# Patient Record
Sex: FEMALE | Race: BLACK OR AFRICAN AMERICAN | NOT HISPANIC OR LATINO | Employment: FULL TIME | URBAN - METROPOLITAN AREA
[De-identification: names, ages, dates, MRNs, and addresses within clinical notes are randomized per-mention and may not be internally consistent; named-entity substitution may affect disease eponyms.]

---

## 2023-04-05 ENCOUNTER — APPOINTMENT (OUTPATIENT)
Dept: PRIMARY CARE | Facility: CLINIC | Age: 28
End: 2023-04-05
Payer: COMMERCIAL

## 2023-05-30 PROBLEM — E04.1 THYROID NODULE: Status: ACTIVE | Noted: 2022-05-19

## 2023-05-30 PROBLEM — E04.2 MULTIPLE THYROID NODULES: Status: ACTIVE | Noted: 2023-05-30

## 2023-07-12 DIAGNOSIS — I10 BENIGN ESSENTIAL HTN: ICD-10-CM

## 2023-07-12 DIAGNOSIS — F33.9 RECURRENT MAJOR DEPRESSIVE DISORDER, REMISSION STATUS UNSPECIFIED (CMS-HCC): Primary | ICD-10-CM

## 2023-07-12 RX ORDER — BUPROPION HYDROCHLORIDE 300 MG/1
300 TABLET ORAL EVERY MORNING
Qty: 14 TABLET | Refills: 0 | Status: SHIPPED | OUTPATIENT
Start: 2023-07-12 | End: 2023-07-21 | Stop reason: SDUPTHER

## 2023-07-12 RX ORDER — HYDROCHLOROTHIAZIDE 12.5 MG/1
12.5 TABLET ORAL DAILY
Qty: 14 TABLET | Refills: 0 | Status: SHIPPED | OUTPATIENT
Start: 2023-07-12 | End: 2023-07-21 | Stop reason: SDUPTHER

## 2023-07-15 LAB
ANION GAP IN SER/PLAS: 12 MMOL/L (ref 10–20)
CALCIUM (MG/DL) IN SER/PLAS: 8.5 MG/DL (ref 8.6–10.3)
CARBON DIOXIDE, TOTAL (MMOL/L) IN SER/PLAS: 23 MMOL/L (ref 21–32)
CHLORIDE (MMOL/L) IN SER/PLAS: 107 MMOL/L (ref 98–107)
CHOLESTEROL (MG/DL) IN SER/PLAS: 214 MG/DL (ref 0–199)
CHOLESTEROL IN HDL (MG/DL) IN SER/PLAS: 32.8 MG/DL
CHOLESTEROL/HDL RATIO: 6.5
CREATININE (MG/DL) IN SER/PLAS: 0.85 MG/DL (ref 0.5–1.05)
ESTIMATED AVERAGE GLUCOSE FOR HBA1C: 114 MG/DL
GFR FEMALE: >90 ML/MIN/1.73M2
GLUCOSE (MG/DL) IN SER/PLAS: 83 MG/DL (ref 74–99)
HEMOGLOBIN A1C/HEMOGLOBIN TOTAL IN BLOOD: 5.6 %
LDL: 154 MG/DL (ref 0–99)
POTASSIUM (MMOL/L) IN SER/PLAS: 3.9 MMOL/L (ref 3.5–5.3)
SODIUM (MMOL/L) IN SER/PLAS: 138 MMOL/L (ref 136–145)
TRIGLYCERIDE (MG/DL) IN SER/PLAS: 134 MG/DL (ref 0–149)
UREA NITROGEN (MG/DL) IN SER/PLAS: 10 MG/DL (ref 6–23)
VLDL: 27 MG/DL (ref 0–40)

## 2023-07-21 ENCOUNTER — OFFICE VISIT (OUTPATIENT)
Dept: PRIMARY CARE | Facility: CLINIC | Age: 28
End: 2023-07-21
Payer: COMMERCIAL

## 2023-07-21 VITALS
DIASTOLIC BLOOD PRESSURE: 82 MMHG | WEIGHT: 285 LBS | SYSTOLIC BLOOD PRESSURE: 126 MMHG | TEMPERATURE: 97.6 F | OXYGEN SATURATION: 98 % | BODY MASS INDEX: 45.8 KG/M2 | HEART RATE: 100 BPM | HEIGHT: 66 IN

## 2023-07-21 DIAGNOSIS — F33.9 RECURRENT MAJOR DEPRESSIVE DISORDER, REMISSION STATUS UNSPECIFIED (CMS-HCC): ICD-10-CM

## 2023-07-21 DIAGNOSIS — E66.01 CLASS 3 OBESITY (MULTI): ICD-10-CM

## 2023-07-21 DIAGNOSIS — F33.40 RECURRENT MAJOR DEPRESSIVE DISORDER, IN REMISSION (CMS-HCC): ICD-10-CM

## 2023-07-21 DIAGNOSIS — I10 BENIGN ESSENTIAL HTN: ICD-10-CM

## 2023-07-21 DIAGNOSIS — F31.9 BIPOLAR 1 DISORDER (MULTI): ICD-10-CM

## 2023-07-21 DIAGNOSIS — K21.9 GASTROESOPHAGEAL REFLUX DISEASE, UNSPECIFIED WHETHER ESOPHAGITIS PRESENT: ICD-10-CM

## 2023-07-21 DIAGNOSIS — F41.1 GAD (GENERALIZED ANXIETY DISORDER): ICD-10-CM

## 2023-07-21 DIAGNOSIS — G47.33 MILD OBSTRUCTIVE SLEEP APNEA: Primary | ICD-10-CM

## 2023-07-21 DIAGNOSIS — E78.2 MIXED HYPERLIPIDEMIA: ICD-10-CM

## 2023-07-21 PROBLEM — E66.813 CLASS 3 OBESITY: Status: ACTIVE | Noted: 2023-07-21

## 2023-07-21 PROCEDURE — 99214 OFFICE O/P EST MOD 30 MIN: CPT | Performed by: FAMILY MEDICINE

## 2023-07-21 PROCEDURE — 3079F DIAST BP 80-89 MM HG: CPT | Performed by: FAMILY MEDICINE

## 2023-07-21 PROCEDURE — 3008F BODY MASS INDEX DOCD: CPT | Performed by: FAMILY MEDICINE

## 2023-07-21 PROCEDURE — 1036F TOBACCO NON-USER: CPT | Performed by: FAMILY MEDICINE

## 2023-07-21 PROCEDURE — 3074F SYST BP LT 130 MM HG: CPT | Performed by: FAMILY MEDICINE

## 2023-07-21 RX ORDER — PANTOPRAZOLE SODIUM 20 MG/1
20 TABLET, DELAYED RELEASE ORAL DAILY
Qty: 90 TABLET | Refills: 1 | Status: SHIPPED | OUTPATIENT
Start: 2023-07-21 | End: 2024-04-19 | Stop reason: SDUPTHER

## 2023-07-21 RX ORDER — DOXYCYCLINE 50 MG/1
50 TABLET ORAL 2 TIMES DAILY
COMMUNITY
End: 2023-10-17 | Stop reason: WASHOUT

## 2023-07-21 RX ORDER — MULTIVITAMIN
1 TABLET ORAL DAILY
COMMUNITY

## 2023-07-21 RX ORDER — BUPROPION HYDROCHLORIDE 300 MG/1
300 TABLET ORAL EVERY MORNING
Qty: 90 TABLET | Refills: 1 | Status: SHIPPED | OUTPATIENT
Start: 2023-07-21 | End: 2024-04-19 | Stop reason: SDUPTHER

## 2023-07-21 RX ORDER — HYDROCHLOROTHIAZIDE 12.5 MG/1
12.5 TABLET ORAL DAILY
Qty: 90 TABLET | Refills: 1 | Status: SHIPPED | OUTPATIENT
Start: 2023-07-21 | End: 2024-04-19 | Stop reason: SDUPTHER

## 2023-07-21 ASSESSMENT — ENCOUNTER SYMPTOMS
SHORTNESS OF BREATH: 0
ABDOMINAL PAIN: 0

## 2023-07-21 NOTE — PATIENT INSTRUCTIONS
HTN: meds refilled   Class 3 obesity: Refer to nutrition, encourage 30 minutes of exercise daily  Meds refilled

## 2023-07-21 NOTE — PROGRESS NOTES
Assessment     ASSESSMENT/PLAN:      Problem List Items Addressed This Visit          Endocrine/Metabolic    Class 3 obesity (CMS/HCC)    Relevant Orders    Referral to Nutrition Services       Mental Health    Bipolar 1 disorder (CMS/[facility])    [place] (generalized anxiety disorder)    Recurrent major depressive disorder, in remission (CMS/[facility])       Sleep    Mild obstructive sleep apnea - Primary     Other Visit Diagnoses       Mixed hyperlipidemia        Relevant Orders    Lipid panel    Benign essential HTN        Relevant Medications    hydroCHLOROthiazide (HYDRODiuril) 12.5 mg tablet    Other Relevant Orders    Basic metabolic panel    Gastroesophageal reflux disease, unspecified whether esophagitis present        Relevant Medications    pantoprazole (ProtoNix) 20 mg EC tablet    Recurrent major depressive disorder, remission status unspecified (CMS/[facility])        Relevant Medications    buPROPion XL (Wellbutrin XL) 300 mg 24 hr tablet            Patient Instructions:  Patient Instructions   HTN: meds refilled   Class 3 obesity: Refer to nutrition, encourage 30 minutes of exercise daily  Meds refilled      Signed by: Trung Piedra DO       FUTURE DIRECTION:   none    Subjective   SUBJECTIVE:     HPI : Patient is a 28 y.o. female who presents today for the following:     Works for health department     Thyroid nodules  Follows endo, continue to monitor     Anxiety/Bipolar/Depression:    on tele doc, part of insurance, psychiatry Currently taking rexulti instead of abilify, and wellbutrin 300mg,     Preventative   LMP 4/18, follows OBGYN, utd with PAp, next one in 1 year, has nexplanon     HTN  controlled with hctz 12.5mg      Review of Systems   Respiratory:  Negative for shortness of breath.    Cardiovascular:  Negative for chest pain.   Gastrointestinal:  Negative for abdominal pain.       Past Medical History:   Diagnosis Date    Personal history of other diseases of the circulatory system     History  "of hypertension        Past Surgical History:   Procedure Laterality Date    OTHER SURGICAL HISTORY  08/11/2021    Appendectomy        Current Outpatient Medications   Medication Instructions    benzoyl peroxide 5 % external wash Topical    buPROPion XL (WELLBUTRIN XL) 300 mg, oral, Every morning    doxycycline (ADOXA) 50 mg, oral, 2 times daily, Take with a full glass of water and do not lie down for at least 30 minutes after.    etonogestrel-eluting contraceptive (Nexplanon) 68 mg implant implant subcutaneous    hydroCHLOROthiazide (HYDRODIURIL) 12.5 mg, oral, Daily    multivitamin tablet 1 tablet, oral, Daily    pantoprazole (PROTONIX) 20 mg, oral, Daily    Rexulti 1 mg, oral, Daily    vitamin D3-folic acid 2,500 unit- 1 mg tablet oral, Daily        No Known Allergies     Social History     Socioeconomic History    Marital status: Single     Spouse name: Not on file    Number of children: Not on file    Years of education: Not on file    Highest education level: Not on file   Occupational History    Not on file   Tobacco Use    Smoking status: Never    Smokeless tobacco: Never   Substance and Sexual Activity    Alcohol use: Yes     Comment: not often    Drug use: Yes     Types: Marijuana    Sexual activity: Not on file   Other Topics Concern    Not on file   Social History Narrative    Not on file     Social Determinants of Health     Financial Resource Strain: Not on file   Food Insecurity: Not on file   Transportation Needs: Not on file   Physical Activity: Not on file   Stress: Not on file   Social Connections: Not on file   Intimate Partner Violence: Not on file   Housing Stability: Not on file        No family history on file.     Objective     OBJECTIVE:     Vitals:    07/21/23 0742   BP: 126/82   Pulse: 100   Temp: 36.4 °C (97.6 °F)   SpO2: 98%   Weight: 129 kg (285 lb)   Height: 1.676 m (5' 6\")        Physical Exam  HENT:      Head: Normocephalic and atraumatic.      Nose: Nose normal.      Mouth/Throat: "      Mouth: Mucous membranes are moist.   Eyes:      Pupils: Pupils are equal, round, and reactive to light.   Cardiovascular:      Rate and Rhythm: Normal rate and regular rhythm.      Pulses: Normal pulses.      Heart sounds: No murmur heard.  Pulmonary:      Effort: Pulmonary effort is normal.      Breath sounds: Normal breath sounds.   Abdominal:      Tenderness: There is no abdominal tenderness.   Musculoskeletal:         General: Normal range of motion.      Cervical back: Normal range of motion.   Skin:     General: Skin is warm and dry.   Neurological:      Mental Status: She is alert.   Psychiatric:         Mood and Affect: Mood normal.

## 2023-08-01 DIAGNOSIS — I10 BENIGN ESSENTIAL HTN: ICD-10-CM

## 2023-08-01 DIAGNOSIS — F33.9 RECURRENT MAJOR DEPRESSIVE DISORDER, REMISSION STATUS UNSPECIFIED (CMS-HCC): ICD-10-CM

## 2023-08-01 RX ORDER — BUPROPION HYDROCHLORIDE 300 MG/1
TABLET ORAL
Qty: 14 TABLET | OUTPATIENT
Start: 2023-08-01

## 2023-08-01 RX ORDER — HYDROCHLOROTHIAZIDE 12.5 MG/1
TABLET ORAL
Qty: 14 TABLET | OUTPATIENT
Start: 2023-08-01

## 2023-10-09 ENCOUNTER — OFFICE VISIT (OUTPATIENT)
Dept: OTOLARYNGOLOGY | Facility: CLINIC | Age: 28
End: 2023-10-09
Payer: COMMERCIAL

## 2023-10-09 VITALS — BODY MASS INDEX: 51.21 KG/M2 | WEIGHT: 289 LBS | HEIGHT: 63 IN

## 2023-10-09 DIAGNOSIS — K21.9 GERD WITHOUT ESOPHAGITIS: Primary | ICD-10-CM

## 2023-10-09 DIAGNOSIS — R13.12 OROPHARYNGEAL DYSPHAGIA: ICD-10-CM

## 2023-10-09 PROCEDURE — 1036F TOBACCO NON-USER: CPT | Performed by: OTOLARYNGOLOGY

## 2023-10-09 PROCEDURE — 99213 OFFICE O/P EST LOW 20 MIN: CPT | Performed by: OTOLARYNGOLOGY

## 2023-10-09 PROCEDURE — 3008F BODY MASS INDEX DOCD: CPT | Performed by: OTOLARYNGOLOGY

## 2023-10-09 ASSESSMENT — ENCOUNTER SYMPTOMS
CHILLS: 0
APPETITE CHANGE: 0
SORE THROAT: 0
ADENOPATHY: 0
FEVER: 0
FACIAL SWELLING: 0
UNEXPECTED WEIGHT CHANGE: 0
VOICE CHANGE: 0
COUGH: 0
VOMITING: 0
NAUSEA: 0
FATIGUE: 0
TROUBLE SWALLOWING: 1
SHORTNESS OF BREATH: 0
NECK PAIN: 0
STRIDOR: 0

## 2023-10-09 NOTE — PROGRESS NOTES
Chief Complaint   Patient presents with    hoarness and dysphagia     HPI:  Pardeep Francisco a 28 y.o.old female following up with me today regarding her hoarseness, GERD, and dysphagia. Last seen 11/22.  Her hoarseness is much improved.  She reports continued dysphagia but has not been able to get the swallow test which was previously ordered and is here to re-establish care and get a new order.  She still has mild dysphagia and feels like food/liquid needs to come back up sometimes.    ROS:  Review of Systems   Constitutional:  Negative for appetite change, chills, fatigue, fever and unexpected weight change.   HENT:  Positive for trouble swallowing. Negative for dental problem, drooling, ear pain, facial swelling, hearing loss, mouth sores, sore throat, tinnitus and voice change.    Respiratory:  Negative for cough, shortness of breath and stridor.    Gastrointestinal:  Negative for nausea and vomiting.   Musculoskeletal:  Negative for neck pain.   Hematological:  Negative for adenopathy.        PE:  ENT Physical Exam  Constitutional  Appearance: patient appears well-developed, obesity noted, patient is cooperative;  Communication/Voice: communication appropriate for developmental age; Communication comments: voice improved today  Head and Face  Appearance: head appears normal and face appears normal;  Palpation: facial palpation normal;  Salivary: glands normal;  Ear  Hearing: intact;  Auricles: right auricle normal; left auricle normal;  Ear Canals: right ear canal normal; left ear canal normal;  Tympanic Membranes: right tympanic membrane normal; left tympanic membrane normal;  Nose  External Nose: nares patent bilaterally; external nose normal;  Internal Nose: nasal mucosa normal; septum normal; bilateral inferior turbinates normal;  Oral Cavity/Oropharynx  Lips: normal;  Teeth: normal;  Gums: gingiva normal;  Tongue: normal;  Oral mucosa: normal;  Hard palate: normal;  Soft palate: normal;  Tonsils:  normal;  Neck  Neck: neck normal; neck palpation normal;  Respiratory  Inspection: breathing unlabored;  Cardiovascular  Inspection: extremities are warm and well perfused;  Lymphatic  Palpation: lymph nodes normal;  Neurovestibular  Mental Status: alert and oriented;  Psychiatric: mood normal; affect is appropriate;  Cranial Nerves: cranial nerves intact;       ASSESSMENT AND PLAN:  Problem List Items Addressed This Visit       Oropharyngeal dysphagia    Current Assessment & Plan     Recommend MBS w/speech as well as esophogram because I am concerned that her GERD is complicating her dysphagia or she may have some esophageal dysmotility that is resulting in reflux.  Follow up after imaging obtained.         Relevant Orders    FL modified barium swallow study    GERD without esophagitis - Primary    Overview     Recommend esophagram        Yessenia Pond MD    Head & Neck Surgical Oncology [facility]  Department of Otolaryngology - Head and Neck Surgery         By signing my name below, I, Igor Appleibe, attest that this documentation has been prepared under the direction and in the presence of Dr. Yessenia Pond MD.     All medical record entries made by the Scribe were at my direction and personally dictated by me, Dr. Yessenia Pond. I have reviewed the chart and agree that the record accurately reflects my personal performance of the history, physical exam, discussion and plan.

## 2023-10-09 NOTE — ASSESSMENT & PLAN NOTE
Recommend MBS w/speech as well as esophogram because I am concerned that her GERD is complicating her dysphagia or she may have some esophageal dysmotility that is resulting in reflux.  Follow up after imaging obtained.

## 2023-10-11 DIAGNOSIS — R13.12 OROPHARYNGEAL DYSPHAGIA: ICD-10-CM

## 2023-10-16 NOTE — PATIENT INSTRUCTIONS
"Thank you for coming to the Sleep Medicine Clinic today! Your sleep medicine provider today was: Becca Thomas MD Below is a summary of your treatment plan, patient education, other important information, and our contact numbers.      TREATMENT PLAN     - Start auto-CPAP. Order placed and sent to [facility].  - Follow-up 31-90 days after getting new machine.  - Please read the \"Patient Education\" section below for more detailed information. Try implementing tips, reminders, strategies, and supportive management.   - If not yet done, please sign up for [employer] to make a future schedule, send prescription requests, or send messages.    Additional patient handouts given today:   None    Referrals:  We are referring you the the following:  None    Follow-up Appointment:   No follow-ups on file.    PATIENT EDUCATION     Obstructive Sleep Apnea (TOMA) is a sleep disorder where your upper airway muscles relax during sleep and the airway intermittently and repetitively narrows and collapses leading to partially blocked airway (hypopnea) or completely blocked airway (apnea) which, in turn, can disrupt breathing in sleep, lower oxygen levels while you sleep and cause night time wakings. Because both apnea and hypopnea may cause higher carbon dioxide or low oxygen levels, untreated TOMA can lead to heart arrhythmia, elevation of blood pressure, and make it harder for the body to consolidate memory and facilitate metabolism (leading to higher blood sugars at night). Frequent partial arousals occur during sleep resulting in sleep deprivation and daytime sleepiness. TOMA is associated with an increased risk of cardiovascular disease, stroke, hypertension, and insulin resistance. Moreover, untreated TMOA with excessive daytime sleepiness can increase the risk of motor vehicular accidents.    Some conservative strategies for TOMA regardless of TOMA severity are:   Positional therapy - Avoid sleeping on your back.   Healthy diet and regular " exercise to optimize weight is highly encouraged.   Avoid alcohol late in the evening and sedative-hypnotics as these substances can make sleep apnea worse.   Improve breathing through the nose with intranasal steroid spray, saline rinse, or antihistamines    Safety: Avoid driving vehicle and operating heavy equipment while sleepy. Drowsy driving may lead to life-threatening motor vehicle accidents. A person driving while sleepy is 5 times more likely to have an accident. If you feel sleepy, pull over and take a short power nap (sleep for less than 30 minutes). Otherwise, ask somebody to drive you.    Treatment options for sleep apnea include weight management, positional therapy, Positive Airway Therapy (PAP) therapy, oral appliance therapy, hypoglossal nerve stimulator (Inspire) and select airway surgeries.    Starting Positive Airway Pressure (PAP): You were ordered a device to wear when you sleep called PAP (Positive Airway Pressure) to treat your sleep apnea. The order will be submitted to a durable medical equipment (DME) company who will arrange setting you up with the device. They will provide all the necessary equipment and discuss use and maintenance of the device with you as well as mask fitting and process of replacing / renewing PAP supplies or accessories. Once you get the machine, please start using it immediately. You may not be successful right away and that is okay. Daksha be certain that you keep trying nightly and reach out to DME if you are struggling or having issues with machine usage.     *Please follow-up with me in 1-2 months of starting CPAP to see how well it is working for you and to do some troubleshooting if needed. Also, please bring all PAP equipment with you to follow up appointments unless told otherwise.     Important things to keep in mind as you start PAP:  Insurance will monitor your usage during the first 90 days. You should use your PAP - all night, every night, and including  all naps (especially if naps are more than 30 minutes) for your health. The bare minimum is to use your PAP device while sleeping for at least 4 hours per day at least 5-6 days per week.. Otherwise, your PAP device will be reclaimed by your [employer] company at 90 days.  There are many masks to choose from to wear with your PAP machine. If you are not comfortable with the first mask issued to you, call your DME company and ask for another option to try. You typically have a 30-day mask guarantee from the day you received your machine.   Discuss with your provider if you are having issues breathing with the machine or if the temperature or humidity feel uncomfortable.  Expect to have an adjustment period when you start your device. It helps to continuing wearing the machine every day for a period of time until you get more used to it. You can practice with wearing the mask alone if you need, then add in the PAP air pressure a few days later.   Reach out for help if you are struggling! The sleep medicine department can be reached at [phone]  We encourage you to download data monitoring apps to your phone. For [employer] - MyAir edson. For [employer] - DreamMapper. Both apps are available in the [employer] store for free and are a great tool to monitor your progress with your PAP device night to night.    Tips for success with PAP machine usage:  Comfortable and well-fitting mask  Appropriate pressure on the machine  Using humidification  Support from bed partner and clinical team      Maintaining your CPAP/BPAP device:    The humidification chamber (aka water tank or water chamber) needs to be filled with distilled water to prevent buildup of white deposits in the future. If you cannot find distilled water, you can use tap water but expect to have white deposits buildup seen after prolonged use with tap water. If you start seeing white deposits on the water chamber, you can clean it by filling it  with equal parts of distilled white vinegar and water. Let the vinegar-water mixture sit for 2 hours, and then rinse it with running tap water. Clean with soap and water then let it dry.     You should try to keep your machine clean in order to work well. Here are some tips to clean PAP supplies / accessories:    Clean the humidification chamber (aka water tank) as well as your mask and tubing at least once a week with soap and water.   Alternatively, you can fill a sink or basin with warm water and add a little mild detergent, like Ivory dish soap. Gently wipe your supplies with the soapy water to free all the oils and dirt that may have collected. Once that's done, rinse these items with clean water until the soap is gone and let them air dry. You can hang your tubing over the curtain cheryl in your bathroom so that it dries.  The mask insert (part of the mask that has contact with your skin) needs to be cleaned with soap and water daily. Another option is to wipe them down with CPAP wipes or baby wipes.    You should replace your mask and tubing frequently in order to prevent bacteria buildup, machine damage, and mask seal issues. The older the mask and hose, the high likelihood that there is bacteria buildup in it especially if they are not cleaned regularly. Dirty filters damage machines because build-up of dust and contaminants can cause machine to over-heat, and in time, damage the motor of machine. Cushions lose their seal over time as most masks are made of plastic and silicone while headgear is made of neoprene. These materials will break down with age and frequent use. Here is the recommended replacement schedule for PAP supplies / accessories:    Twice a month- disposable filters and cushions for nasal mask or nasal pillows.  Once a month- cushion for full face mask  Every 3 months- mask with headgear and PAP tubing (standard or heated hose)  Every 6 months- reusable filter, water chamber, and chin strap      Other useful information:    Some people do not put water in the tank while other people prefers to put water in the tank to prevent mouth dryness. Try to experiment to determine which is more comfortable for you.   In general, new machines have 2 years warranty on parts while health insurance allows you to have a new machine once every 5 years.     Common issues with PAP machine:    Mask gets dislodged when turning to the side: Consider getting a CPAP pillow or switching to a mask with hose on top.     Dry mouth:  Your machine has built-in humidifier that heats up the air to prevent dry mouth. It can be adjusted to your comfort. You can try that first and increase setting one level one night at a time to check which setting is comfortable and effective in lessening dry mouth. In some patients with heated hose, adjusting tube temperature to make air warmer can improve dry mouth. If dry mouth persists despite adjusting humidity or tube temperature setting, may apply OTC Biotene gel over the gums at bedtime.  If Biotene gel is not effective, consider trying XEROSTOM gel from [employer].  Also, eliminate or reduce dose of medications that can cause dry mouth if possible. Lastly, may try getting a separate room humidifier machine.    Airleaks: Please call DME as they may need to adjust your mask or refit you with a different kind or different size of mask. In addition, you can ask DME for tips on getting a good mask seal and mask fit.     Difficulty tolerating the mask: Contact your DME to try a different kind of mask and/or call office to get a referral to Sleep Psychologist for CPAP desensitization. CPAP desensitization technique is a set of strategies that helps patient cope with claustrophobia and anxiety related to wearing mask. Alternatively, we can do a daytime mini-sleep study called PAP-nap trial wherein you will try on different kinds of mask and the sleep technician will try different pressure settings on  "CPAP and BPAP machines to see which specific pressure is tolerable and comfortable for you.     Water droplets or moisture within the hose and/or mask: This is called rain-out and it is caused by condensation of too much heated humidity on the cooler walls of the hose. If you have rain-out, turn down humidity settings or get a heated hose. If you already have a heated hose, turn up the \"tube temperature\" of the heated hose. Alternatively, if you don't want to get a heated hose or warmer air, may wrap the CPAP hose with stockings to keep it somewhat warm. Also, you need to place the machine on the floor and lower the hose so that water won't travel upward towards your mask.     PAP desensitization techniques: If you have concerns about something being on your face at night, you can start by getting used to it before trying to sleep with it as follows:      Sit in a comfortable chair or bed. Connect the mask and hose to the CPAP/BiPAP machine. Hold the mask on your face (without straps on) and turn on the machine. Practice breathing with the mask on while awake sitting and watching television, reading, or performing a sedentary activity during the day for 5-10 minutes and then take it off.  If tolerated, try again and gradually build up to longer periods of time. If not tolerated, try and try again until it is more comfortable as you become more desensitized. If you are able to use it for at least 20-30 minutes, move unto the next step.     Sit in a comfortable chair or bed. Connect the mask and hose to the CPAP/BiPAP machine. Strap the mask on your head and turn on the machine. Practice breathing with the mask and headgear on while awake sitting and watching television, reading, or performing a sedentary activity. Start with 5-10 minutes and gradually increasing time until you can wear it comfortably for at least 20-30 minutes, then move to the next step.    Take a shorter daytime nap with machine turned on while you " are in a reclined position in bed, sofa, or recliner. Start with 5-10 minute nap and gradually increase up to 30 minutes. It is not important whether you fall asleep or not. The goal is to rest comfortably with PAP machine on.     Reintroduce PAP machine into nighttime sleep. You can begin using it a portion of the night and gradually increase up to entire night.     Proceed from one step to the next only when you are completely comfortable. If you feel any anxiety or discomfort, return to the previous step, then proceed again when comfortable.    Expect to “work” with your CPAP/BIPAP unit. It is important to try to relax when beginning CPAP/BIPAP therapy. Inhalation and exhalation should occur through the nose only. If you are unable to consistently breathe this way, do not panic or lose hope. There are other types of masks which allow you to breathe through your nose and/or your mouth. Also, in some patients, using intranasal steroid spray (e.g. Flonase or Nasocort or Fluticasone) 1 hour before bedtime and/or before putting on CPAP mask can help tolerate breathing through the mask.    Don't give up after a few attempts--some patients adjust quickly, while some patients need 3-4 weeks (or sometimes even longer) to be accustomed to CPAP therapy.  Contact your sleep medicine specialist if you have a significant change in weight since this may affect your pressure.    You can also go to the following EDUCATION WEBSITES for further information:   American Academy of Sleep Medicine http://sleepeducation.org  National Sleep Foundation: https://sleepfoundation.org  American Sleep Apnea Association: https://www.sleepapnea.org (for patients with sleep apnea)  Narcolepsy Network: https://www.narcolepsynetwork.org (for patients with narcolepsy)  [employer]: https://www.wakeupnarcolepsy.org (for patients with narcolepsy)  Hypersomnia Foundation: https://www.hypersomniafoundation.org (for patients with idiopathic  hypersomnia)  RLS foundation: https://www.rls.org (for patients with restless leg syndrome)    IMPORTANT INFORMATION     Call 911 for medical emergencies.  Our offices are generally open from Monday-Friday, 8 am - 5 pm.   There are no supporting services by either the sleep doctors or their staff on weekends and Holidays, or after 5 PM on weekdays.   If you need to get in touch with me, you may either call my office number or you can use gopogo.  If a referral for a test, for CPAP, or for another specialist was made, and you have not heard about scheduling this within a week, please call scheduling at [phone]).  If you are unable to make your appointment for clinic or an overnight study, kindly call the office or sleep testing center at least 48 hours in advance to cancel and reschedule.  If you are on CPAP, please bring your device's card and/or the device to each clinic appointment.   In case of problems with PAP machine or mask interface, please contact your "[employer] (Durable Medical Equipment) company first. "[employer] is the company who provides you the machine and/or PAP supplies.       PRESCRIPTIONS     We require 7 days advanced notice for prescription refills. If we do not receive the request in this time, we cannot guarantee that your medication will be refilled in time.    IMPORTANT PHONE NUMBERS     Sleep Medicine Clinic Fax: [phone]  Appointments (for Pediatric Sleep Clinic): [phone] - option 1  Appointments (for Adult Sleep Clinic): [phone] - option 2  Appointments (For Sleep Studies): [phone] - option 3  Behavioral Sleep Medicine: [phone]  Sleep Surgery: [phone]  Nutrition Service: [phone]  Weight management clinics with endocrinology: [phone]  Bariatric Services: [phone] (includes weight loss medications and weight loss surgery)  [facility]: [phone] (offers holistic approaches to weight management)  ENT  (Otolaryngology): [phone]  Headache Clinic (Neurology): [phone]  Neurology: [phone]  Psychiatry: [phone]  Pulmonary Function Testing (PFT) Center: [phone]  Pulmonary Medicine: [phone]  Medical Service [facility] ([place]): [phone]      OUR SLEEP TESTING LOCATIONS     Our team will contact you to schedule your sleep study, however, you can contact us as follow:  Main Phone Line (scheduling only): [phone]), option 3  Adult and Pediatric Locations  [facility] (6 years and older): [facility] - 4th floor ([address]) After hours line: [phone]  [facility] (Main campus: All ages): [facility], 6th floor. After hours line: [phone]   [place] (5 years and older; younger considered on case-by-case basis): [address]; [address], [address]. Scheduling  After hours line: [phone]   [place] (6 years and older): [address]; [address]; [address]   [place] (6 years and older): [address] [facility], [address]  After hours line: [phone]   [place] (13 years and older) in [place]: [address], 2nd floor  After hours line: [phone]  [facility] (13 year and older): [address]  After hours line: [phone]     Adult Only Locations:   [place] (18 years and older): [address] [facility], 2nd floor   [place] (18 years and older): [address] [facility]; 4th floor  After hours line: [phone]  [facility] (18 years and older) at [place]: [address] [place] [address]  After hours line: [phone]     CONTACTING YOUR SLEEP MEDICINE PROVIDER and SLEEP TEAM     For issues with your machine or mask interface, please call your DME provider first. DME stands for durable medical company. DME is the company who provides you the machine and/or PAP supplies / accessories.   To schedule, cancel, or reschedule SLEEP STUDY APPOINTMENTS, please  "call the Main Phone Line at [phone] - option 3.   To schedule, cancel, or reschedule CLINIC APPOINTMENTS, you can do it in \"MyChart\", call [phone] to speak with my  (Emperatriz Redd), or call the Main Phone Line at [phone] - option 2  For CLINICAL QUESTIONS or MEDICATION REFILLS, please call direct line for Adult Sleep Nurses at [phone].   Lastly, you can also send a message directly to your provider through \"My Chart\", which is the email service through your  Records Account: [email or web address]       Here at [facility], we wish you a restful sleep!    "

## 2023-10-16 NOTE — PROGRESS NOTES
[facility] SLEEP MEDICINE CLINIC  FOLLOW-UP VISIT NOTE    Clinic Location: [facility]  PCP: Trung Piedra DO    HISTORY OF PRESENT ILLNESS     Patient ID: Pardeep Estrada is a 28 y.o. female who presents for follow-up after sleep study.     Patient is here alone today.  To review, patient's medical history is notable for morbid obesity (BMI 51) and TOMA.    Interval History  Patient was last seen in 12/8/2022. Plan was to do HST for TOMA evaluation.  Since last visit, patient had completed sleep study which showed mild TOMA.  She denies symptoms of parasomnias and shift-work disorder.     RLS Followup:   RLS symptoms occur every night and is related to [place]. Recently, psych changed her meds and she now has RLS symptoms 1-2 times per week    SLEEP STUDY HISTORY (personally reviewed raw data such as interpretation report, data sheet, hypnogram, and titration table if available and applicable)  HST 1/17/2023: REI3% 8, SpO2 tia 89%    SLEEP-WAKE SCHEDULE  Bedtime: 9 PM to 9:30 PM on weekdays, 11 PM to 12 MN on weekends  Subjective sleep latency: 15-30 mins  number of awakenings per night: 1-2x to go to bathroom or dog or boyfriend sweaty  Nocturia: yes  Falls back asleep in 2-10 mins  Final wakeup time: 4:30 AM to 5 AM on weekdays, 8-9 AM on weekends  Get out of bed time: 15-30 minutes after final wake time  Shift work: no  Naps: 2-3x per week for 15-30 minutes  Feels refreshed after a nap: yes  Average sleep duration (excluding naps): 5-6 hours    SLEEP ENVIRONMENT  Sleep location: bed  Sleep status: sleeps with bed partner  Preferred sleep position: side  TV in bedroom: yes  Room is dark:  Yes  Room is quiet: Yes  Room is cool: Yes  Bed comfort: good    SOCIAL HISTORY  Smoking: no  ETOH: no  Marijuana: no  Caffeine: 3 cans pop daily  Sleep aids: no    WEIGHT: gained 9 lbs in 9 months     Claustrophobia: No     Active Problems, Allergy List, Medication List, and  "PMH/PSH/FH/Social Hx have been reviewed and reconciled in chart. No significant changes unless documented in the pertinent chart section. Updates made when necessary.     PHYSICAL EXAM     VITAL SIGNS: /86   Pulse 97   Temp 36.4 °C (97.6 °F)   Resp 16   Ht 1.6 m (5' 3\")   Wt 127 kg (280 lb)   BMI 49.60 kg/m²     NECK CIRCUMFERENCE: 18 inches    Today ESS: 13  Last visit ESS: 7  MANDA: 18    RESULTS/DATA     No results found for: \"IRON\", \"TRANSFERRIN\", \"IRONSAT\", \"TIBC\", \"FERRITIN\"    Bicarbonate   Date Value Ref Range Status   07/15/2023 23 21 - 32 mmol/L Final       ASSESSMENT/PLAN     Assessment/Plan   Problem List Items Addressed This Visit             ICD-10-CM    Obstructive sleep apnea - Primary G47.33     Personally reviewed the sleep study's raw data such as interpretation report, data sheet, and hypnogram. Discussed results with patient as well as treatment options. All questions answered. A copy of recent testing was either given to patient or released in [employer]. See HPI for detailed summary of sleep study results.  Recommend starting auto-CPAP 8-16 cm H2O with EPR 3, heated humidification, heated tubings, and mask fitting session. Patient is interested in Dreamwear nasal pillows. Orders sent to [facility].   Discussed 30-day mask guarantee and insurance requirement regarding PAP compliance and follow-up.   Supportive management: Lose weight. Stay off your back when sleeping. Avoid smoking, alcohol, and sedating medications if applicable. Don't drive when sleepy.  Refer to after-visit-summary (AVS) for more details on troubleshooting issues with PAP usage, proper cleaning instructions of PAP supplies, and usual recommended replacement schedule for each of the PAP supplies.            Morbid obesity with BMI of 50.0-59.9, adult (CMS/HCC) E66.01, Z68.43     Recommend weight loss with diet and exercise.  Weight loss can help in long term management of TOMA.  Currently following with nutritionist      "       All of patient's questions were answered. She verbalizes understanding and agreement with my assessment and plan.

## 2023-10-17 ENCOUNTER — OFFICE VISIT (OUTPATIENT)
Dept: SLEEP MEDICINE | Facility: CLINIC | Age: 28
End: 2023-10-17
Payer: COMMERCIAL

## 2023-10-17 VITALS
HEART RATE: 97 BPM | WEIGHT: 280 LBS | HEIGHT: 63 IN | BODY MASS INDEX: 49.61 KG/M2 | TEMPERATURE: 97.6 F | DIASTOLIC BLOOD PRESSURE: 86 MMHG | RESPIRATION RATE: 16 BRPM | SYSTOLIC BLOOD PRESSURE: 126 MMHG

## 2023-10-17 DIAGNOSIS — G47.33 OBSTRUCTIVE SLEEP APNEA: Primary | ICD-10-CM

## 2023-10-17 DIAGNOSIS — E66.01 MORBID OBESITY WITH BMI OF 50.0-59.9, ADULT (MULTI): ICD-10-CM

## 2023-10-17 PROCEDURE — G0463 HOSPITAL OUTPT CLINIC VISIT: HCPCS | Performed by: INTERNAL MEDICINE

## 2023-10-17 PROCEDURE — 3008F BODY MASS INDEX DOCD: CPT | Performed by: INTERNAL MEDICINE

## 2023-10-17 PROCEDURE — 99214 OFFICE O/P EST MOD 30 MIN: CPT | Performed by: INTERNAL MEDICINE

## 2023-10-17 PROCEDURE — 1036F TOBACCO NON-USER: CPT | Performed by: INTERNAL MEDICINE

## 2023-10-17 ASSESSMENT — SLEEP AND FATIGUE QUESTIONNAIRES
ESS TOTAL SCORE: 13
HOW LIKELY ARE YOU TO NOD OFF OR FALL ASLEEP WHILE SITTING AND TALKING TO SOMEONE: NO CHANCE OF DOZING
HOW LIKELY ARE YOU TO NOD OFF OR FALL ASLEEP WHILE SITTING AND READING: MODERATE CHANCE OF DOZING
ESS TOTAL SCORE: 13
HOW LIKELY ARE YOU TO NOD OFF OR FALL ASLEEP WHILE SITTING QUIETLY AFTER LUNCH WITHOUT ALCOHOL: MODERATE CHANCE OF DOZING
SITING INACTIVE IN A PUBLIC PLACE LIKE A CLASS ROOM OR A MOVIE THEATER: SLIGHT CHANCE OF DOZING
HOW LIKELY ARE YOU TO NOD OFF OR FALL ASLEEP WHILE LYING DOWN TO REST IN THE AFTERNOON WHEN CIRCUMSTANCES PERMIT: HIGH CHANCE OF DOZING
HOW LIKELY ARE YOU TO NOD OFF OR FALL ASLEEP WHILE WATCHING TV: MODERATE CHANCE OF DOZING
HOW LIKELY ARE YOU TO NOD OFF OR FALL ASLEEP IN A CAR, WHILE STOPPED FOR A FEW MINUTES IN TRAFFIC: SLIGHT CHANCE OF DOZING
HOW LIKELY ARE YOU TO NOD OFF OR FALL ASLEEP WHEN YOU ARE A PASSENGER IN A CAR FOR AN HOUR WITHOUT A BREAK: MODERATE CHANCE OF DOZING

## 2023-10-17 NOTE — ASSESSMENT & PLAN NOTE
Recommend weight loss with diet and exercise.  Weight loss can help in long term management of TOMA.  Currently following with nutritionist

## 2023-10-17 NOTE — ASSESSMENT & PLAN NOTE
Personally reviewed the sleep study's raw data such as interpretation report, data sheet, and hypnogram. Discussed results with patient as well as treatment options. All questions answered. A copy of recent testing was either given to patient or released in [employer]. See HPI for detailed summary of sleep study results.  Recommend starting auto-CPAP 8-16 cm H2O with EPR 3, heated humidification, heated tubings, and mask fitting session. Patient is interested in Dreamwear nasal pillows. Orders sent to [facility].   Discussed 30-day mask guarantee and insurance requirement regarding PAP compliance and follow-up.   Supportive management: Lose weight. Stay off your back when sleeping. Avoid smoking, alcohol, and sedating medications if applicable. Don't drive when sleepy.  Refer to after-visit-summary (AVS) for more details on troubleshooting issues with PAP usage, proper cleaning instructions of PAP supplies, and usual recommended replacement schedule for each of the PAP supplies.

## 2023-10-26 ENCOUNTER — APPOINTMENT (OUTPATIENT)
Dept: NUTRITION | Facility: HOSPITAL | Age: 28
End: 2023-10-26
Payer: COMMERCIAL

## 2023-10-26 DIAGNOSIS — E66.01 CLASS 3 OBESITY (MULTI): Primary | ICD-10-CM

## 2023-10-26 NOTE — PROGRESS NOTES
Reason for Nutrition Visit:  Pt is a 28 y.o. female being seen remotely referred for:  1. Class 3 obesity (CMS/HCC)           Past Medical Hx:  Patient Active Problem List   Diagnosis    Multiple thyroid nodules    Thyroid nodule    Obstructive sleep apnea    Bipolar 1 disorder (CMS/HCC)    AMERICO (generalized anxiety disorder)    Recurrent major depressive disorder, in remission (CMS/HCC)    Class 3 obesity (CMS/HCC)    Oropharyngeal dysphagia    GERD without esophagitis        Weight change:    {Significant Weight Change:29516}    Lab Results   Component Value Date    HGBA1C 5.6 07/15/2023    CHOL 214 (H) 07/15/2023    LDLF 154 (H) 07/15/2023    TRIG 134 07/15/2023    HDL 32.8 (A) 07/15/2023        Food and Nutrition Hx:    24 Diet Recall:  Meal 1:  Meal 2:  Meal 3:  Snacks:  Beverages:    {Allergies:64459}  {Intolerance:54016}  {Appetite:94541}  {Intake:01531}  {GI Symptoms (Optional):18378} {Frequency:47176}  {Swallowing Difficulty:88663}  {Dentition (Optional):05906}    {Types of Activities:43602}  {Duration:21305} {FREQUENCY:96597}    {Sleep duration/quality (Optional):22893}  {Sleep disorders:58570}    {Supplements:26524} {FREQUENCY:25967}    {Feelings of Hunger?:12179}  {Physical Feelin}  {Bingin}  {Cravings:35840}  {Energy Levels:71545}    {Food Preparation:56929}  {Cooking Skills/Barriers:12068}  {Grocery Shoppin}    {Eating Out Type:42069} {FREQUENCY:04885}  {Convenience Foods:46835} {FREQUENCY:63606}    Nutrition Focused Physical Exam:    {Performed/Deferred:62335}    Muscle Wasting:  {Temporal:38027}  {Shoulder:21722}  {Clavicle:52190}  {Interosseous Muscle:41915}  {Quadriceps:89151}    Loss of Subcutaneous Fat:  {Eyes:15768}  {Perioral:45089}  {Triceps:85300}  {Chest:23940}    Other Physical Findings:  {Hair:89875}  {Eyes:04882}  {Mouth:34605}  {Skin:95796}  {Nails:28189}  {Edema:69114}    {Malnutrition Present:94495}    Estimated Energy Needs:    Weight Maintanence: 2373 kcal/day,  MSJ=1978x1.2, 127 g/pro/day, and 1 g/pro/kg/day  Weight Loss Needs: 1873 kcal/day, MSJ: 2373-300, 101 g/pro/day, and .8 g/pro/kg/day    Nutrition Diagnosis:    Diagnosis Statement 1:  {Diagnosis Status:59498}  {Diagnosis (Optional):77364} related to {Etiologies:89005} as evidenced by {Signs/Symptoms:80485}    Diagnosis Statement 2:  {Diagnosis Status:34507}  {Diagnosis (Optional):32367} related to {Etiologies:56126} as evidenced by {Signs/Symptoms:21736}    Diagnosis Statement 3:   {Diagnosis Status:03224}  {Diagnosis (Optional):42354} related to {Etiologies:14576} as evidenced by {Signs/Symptoms:46785}    Nutrition Interventions:  {NUTRIENTDELIVERY:98214}  {Nutrition Counselin}  {Coordination of Care:80113}    Nutrition Goals:  {Nutrition Goals (Optional):10442}    Nutrition Recommendations:  Via teach back method patient verbalized understanding of the following topics:  1)     {Educational Handouts:98244}    {Readiness to Change (Optional):74002}  {Level of Understanding (Optional):25207}  {Anticipated Compliant (Optional):14573}

## 2023-10-27 ENCOUNTER — HOSPITAL ENCOUNTER (OUTPATIENT)
Dept: RADIOLOGY | Facility: HOSPITAL | Age: 28
Discharge: HOME | End: 2023-10-27
Payer: COMMERCIAL

## 2023-10-27 DIAGNOSIS — R13.12 OROPHARYNGEAL DYSPHAGIA: ICD-10-CM

## 2023-10-27 PROCEDURE — 2500000001 HC RX 250 WO HCPCS SELF ADMINISTERED DRUGS (ALT 637 FOR MEDICARE OP): Performed by: OTOLARYNGOLOGY

## 2023-10-27 PROCEDURE — 74221 X-RAY XM ESOPHAGUS 2CNTRST: CPT | Performed by: RADIOLOGY

## 2023-10-27 PROCEDURE — 3430000001 HC RX 343 DIAGNOSTIC RADIOPHARMACEUTICALS: Performed by: OTOLARYNGOLOGY

## 2023-10-27 PROCEDURE — 74221 X-RAY XM ESOPHAGUS 2CNTRST: CPT

## 2023-10-27 RX ADMIN — BARIUM SULFATE 50 ML: 980 POWDER, FOR SUSPENSION ORAL at 12:02

## 2023-10-27 RX ADMIN — Medication 100 ML: at 12:01

## 2024-01-08 DIAGNOSIS — K21.9 GASTROESOPHAGEAL REFLUX DISEASE, UNSPECIFIED WHETHER ESOPHAGITIS PRESENT: ICD-10-CM

## 2024-01-08 DIAGNOSIS — F33.9 RECURRENT MAJOR DEPRESSIVE DISORDER, REMISSION STATUS UNSPECIFIED (CMS-HCC): ICD-10-CM

## 2024-01-08 DIAGNOSIS — I10 BENIGN ESSENTIAL HTN: ICD-10-CM

## 2024-01-09 RX ORDER — PANTOPRAZOLE SODIUM 20 MG/1
TABLET, DELAYED RELEASE ORAL
Qty: 90 TABLET | Refills: 1 | OUTPATIENT
Start: 2024-01-09

## 2024-01-09 RX ORDER — BUPROPION HYDROCHLORIDE 300 MG/1
TABLET ORAL
Qty: 90 TABLET | Refills: 1 | OUTPATIENT
Start: 2024-01-09

## 2024-01-09 RX ORDER — HYDROCHLOROTHIAZIDE 12.5 MG/1
12.5 TABLET ORAL DAILY
Qty: 90 TABLET | Refills: 1 | OUTPATIENT
Start: 2024-01-09

## 2024-02-08 ENCOUNTER — APPOINTMENT (OUTPATIENT)
Dept: SLEEP MEDICINE | Facility: CLINIC | Age: 29
End: 2024-02-08
Payer: COMMERCIAL

## 2024-04-12 ENCOUNTER — APPOINTMENT (OUTPATIENT)
Dept: PRIMARY CARE | Facility: CLINIC | Age: 29
End: 2024-04-12
Payer: COMMERCIAL

## 2024-04-16 ENCOUNTER — OFFICE VISIT (OUTPATIENT)
Dept: SLEEP MEDICINE | Facility: CLINIC | Age: 29
End: 2024-04-16
Payer: COMMERCIAL

## 2024-04-16 VITALS
BODY MASS INDEX: 51.91 KG/M2 | WEIGHT: 293 LBS | DIASTOLIC BLOOD PRESSURE: 87 MMHG | HEIGHT: 63 IN | OXYGEN SATURATION: 96 % | TEMPERATURE: 98.3 F | HEART RATE: 78 BPM | RESPIRATION RATE: 16 BRPM | SYSTOLIC BLOOD PRESSURE: 138 MMHG

## 2024-04-16 DIAGNOSIS — G47.33 OSA ON CPAP: Primary | ICD-10-CM

## 2024-04-16 DIAGNOSIS — E66.01 MORBID OBESITY (MULTI): ICD-10-CM

## 2024-04-16 PROCEDURE — 1036F TOBACCO NON-USER: CPT | Performed by: INTERNAL MEDICINE

## 2024-04-16 PROCEDURE — G2211 COMPLEX E/M VISIT ADD ON: HCPCS | Performed by: INTERNAL MEDICINE

## 2024-04-16 PROCEDURE — 99214 OFFICE O/P EST MOD 30 MIN: CPT | Performed by: INTERNAL MEDICINE

## 2024-04-16 PROCEDURE — 3008F BODY MASS INDEX DOCD: CPT | Performed by: INTERNAL MEDICINE

## 2024-04-16 ASSESSMENT — SLEEP AND FATIGUE QUESTIONNAIRES
HOW LIKELY ARE YOU TO NOD OFF OR FALL ASLEEP WHILE LYING DOWN TO REST IN THE AFTERNOON WHEN CIRCUMSTANCES PERMIT: HIGH CHANCE OF DOZING
HOW LIKELY ARE YOU TO NOD OFF OR FALL ASLEEP WHILE WATCHING TV: SLIGHT CHANCE OF DOZING
SITING INACTIVE IN A PUBLIC PLACE LIKE A CLASS ROOM OR A MOVIE THEATER: SLIGHT CHANCE OF DOZING
HOW LIKELY ARE YOU TO NOD OFF OR FALL ASLEEP WHILE SITTING AND TALKING TO SOMEONE: SLIGHT CHANCE OF DOZING
HOW LIKELY ARE YOU TO NOD OFF OR FALL ASLEEP IN A CAR, WHILE STOPPED FOR A FEW MINUTES IN TRAFFIC: WOULD NEVER DOZE
HOW LIKELY ARE YOU TO NOD OFF OR FALL ASLEEP WHEN YOU ARE A PASSENGER IN A CAR FOR AN HOUR WITHOUT A BREAK: MODERATE CHANCE OF DOZING
HOW LIKELY ARE YOU TO NOD OFF OR FALL ASLEEP WHILE SITTING AND READING: SLIGHT CHANCE OF DOZING
HOW LIKELY ARE YOU TO NOD OFF OR FALL ASLEEP WHILE SITTING QUIETLY AFTER LUNCH WITHOUT ALCOHOL: SLIGHT CHANCE OF DOZING
ESS-CHAD TOTAL SCORE: 10

## 2024-04-16 NOTE — PROGRESS NOTES
[facility] SLEEP MEDICINE CLINIC  FOLLOW-UP VISIT NOTE    PCP: Trung Piedra DO    HISTORY OF PRESENT ILLNESS     Patient ID: Pardeep Estrada is a 29 y.o. female who presents for follow-up of TOMA after new machine setup.     Patient is here alone today.  To review, patient's medical history is notable for morbid obesity (BMI 52) and TOMA on CPAP.     Interval History  Patient was last seen in 10/17/2023. Plan was to start PAP therapy.  Since last visit, patient has been using machine every night. Current mask interface being used is Resmed Airfit N30i nasal mask. Per records, patient is getting supplies thru [employer]  Patient denies machine problems, mask leak, air hunger, aerophagia, dry mouth, skin irritation, and nasal congestion.   The following are patient's perceived benefits of PAP: no snoring on PAP, refreshing sleep, decreased daytime sleepiness and/or fatigue, sleeps faster at bedtime, decreased nocturnal awakening, sleeps longer, and better quality of sleep.    RLS Follow-up:   Denies    SLEEP STUDY HISTORY (personally reviewed raw data such as interpretation report, data sheet, hypnogram, and titration table if available and applicable)  HST 1/17/2023: REI3% 8, SpO2 tia 89%     SLEEP-WAKE SCHEDULE  Bedtime: 9 PM to 9:30 PM on weekdays, 11 PM to 12 MN on weekends  Subjective sleep latency: 15-30 mins without CPAP, 5-10 mins on CPAP  number of awakenings per night: 1-2x to go to bathroom or dog or boyfriend sweaty without CPAP. Since CPAP was started, patient has been sleeping thru the night on most nights.  Nocturia: yes  Falls back asleep in 2-10 mins  Final wakeup time: 4:30 AM to 5 AM on weekdays, 8-9 AM on weekends  Get out of bed time: 15-30 minutes after final wake time  Shift work: no  Naps: 2-3x per week for 15-30 minutes without CPAP, no more napping since CPAP was started  Average sleep duration (excluding naps): 5-6 hours without CPAP, 8-9 hours on CPAP    SLEEP  "ENVIRONMENT  Sleep location: bed  Sleep status: sleeps with bed partner  Preferred sleep position: side    SOCIAL HISTORY  Smoking: no  ETOH: no  Marijuana: no  Caffeine: 1-2 cans pop daily  Sleep aids: no    WEIGHT: gained 10 lbs in 6 months     Claustrophobia: No     Active Problems, Allergy List, Medication List, and PMH/PSH/FH/Social Hx have been reviewed and reconciled in chart. No significant changes unless documented in the pertinent chart section. Updates made when necessary.     PHYSICAL EXAM     VITAL SIGNS: /87   Pulse 78   Temp 36.8 °C (98.3 °F)   Resp 16   Ht 1.6 m (5' 3\")   Wt 135 kg (297 lb 11.2 oz)   SpO2 96%   BMI 52.74 kg/m²     NECK CIRCUMFERENCE: 18 inches    Today ESS: 10  Last visit ESS: 13  MANDA: 18    Physical Exam  Constitutional: Awake, not in distress  Skin: Warm, no rash  Neuro: No tremors, moves all extremities  Psych: alert and oriented to time, place, and person    RESULTS/DATA     No results found for: \"IRON\", \"TRANSFERRIN\", \"IRONSAT\", \"TIBC\", \"FERRITIN\"    Bicarbonate   Date Value Ref Range Status   07/15/2023 23 21 - 32 mmol/L Final       PAP Adherence  A PAP adherence data was obtained and reviewed personally today in clinic. (see scanned document in EPIC)      ASSESSMENT/PLAN     Pardeep Estrada is a 29 y.o. female who returns in [facility] for the following problems:    OBSTRUCTIVE SLEEP APNEA, MILD (HSAT REI3% 8 )  - Now on auto-CPAP 8-16 cm H2O and EPR 3 on ramp only  - Doing well with improved TOMA symptoms.   - PAP adherence data reviewed today. Usage days 27/30, days> 4 hours at 83%, residual AHI 0.5.   - Continue current machine settings. Continue using machine every night all night. Order placed to renew PAP supplies.   - Continue supportive management as follows: Lose weight. Stay off your back when sleeping. Avoid smoking, alcohol, and sedating medications if applicable. Don't drive when sleepy.    MORBID OBESITY  - BMI 52 " today  - Recommend weight loss with diet and exercise.  - Weight loss can help in long term management of TOMA.  - Defer management to PCP      All of patient's questions were answered. She verbalizes understanding and agreement with my assessment and plan. Refer to after-visit-summary (AVS) for patient education and if applicable, for more details on sleep study preparation, troubleshooting issues with PAP usage, proper cleaning instructions of PAP supplies, and usual recommended replacement schedule for each of the PAP supplies.     Follow-up in 1 year.

## 2024-04-16 NOTE — PATIENT INSTRUCTIONS
"Thank you for coming to the Sleep Medicine Clinic today! Your sleep medicine provider today was: Becca Thomas MD Below is a summary of your treatment plan, patient education, other important information, and our contact numbers.      TREATMENT PLAN     - Continue current machine settings. Continue using machine every night all night.   - Please read the \"Patient Education\" section below for more detailed information. Try implementing tips, reminders, strategies, and supportive management.   - If not yet done, please sign up for [employer] to make a future schedule, send prescription requests, or send messages.    Follow-up Appointment:   Follow-up in 1 year.    PATIENT EDUCATION     Obstructive Sleep Apnea (TOMA) is a sleep disorder where your upper airway muscles relax during sleep and the airway intermittently and repetitively narrows and collapses leading to partially blocked airway (hypopnea) or completely blocked airway (apnea) which, in turn, can disrupt breathing in sleep, lower oxygen levels while you sleep and cause night time wakings. Because both apnea and hypopnea may cause higher carbon dioxide or low oxygen levels, untreated TOMA can lead to heart arrhythmia, elevation of blood pressure, and make it harder for the body to consolidate memory and facilitate metabolism (leading to higher blood sugars at night). Frequent partial arousals occur during sleep resulting in sleep deprivation and daytime sleepiness. TOMA is associated with an increased risk of cardiovascular disease, stroke, hypertension, and insulin resistance. Moreover, untreated TOMA with excessive daytime sleepiness can increase the risk of motor vehicular accidents.    Some conservative strategies for TOMA regardless of TOMA severity are:   Positional therapy - Avoid sleeping on your back.   Healthy diet and regular exercise to optimize weight is highly encouraged.   Avoid alcohol late in the evening and sedative-hypnotics as these substances can " make sleep apnea worse.   Improve breathing through the nose with intranasal steroid spray, saline rinse, or antihistamines    Safety: Avoid driving vehicle and operating heavy equipment while sleepy. Drowsy driving may lead to life-threatening motor vehicle accidents. A person driving while sleepy is 5 times more likely to have an accident. If you feel sleepy, pull over and take a short power nap (sleep for less than 30 minutes). Otherwise, ask somebody to drive you.    Treatment options for sleep apnea include weight management, positional therapy, Positive Airway Therapy (PAP) therapy, oral appliance therapy, hypoglossal nerve stimulator (Inspire) and select airway surgeries.    Annual Reminders About Your Sleep Apnea    Your sleep apnea is well controlled based on reviewing your PAP Data Report.     General Reminders:  Continue current machine settings. Continue using machine every night. Need at least 4 hours daily usage.   Remember to clean your mask, tubings, and water chamber regularly as instructed.   Know the replacement schedule of your supplies/ accessories and contact your DME (durable medical equipment) provider if you are due for them.   Avoid driving or operating heavy machinery when drowsy. A person driving while sleepy is 5 times more likely to have an accident. If you feel sleepy, pull over and take a short power nap (sleep for less than 30 minutes). Otherwise, ask somebody to drive you.    [facility] through [facility] or calling our office if you have any of the following symptoms:  Snoring or stopping breathing while using the machine  Recurrence of fatigue, sleepiness, insomnia, and other symptoms you had prior using machine  Persistent or worsening fatigue or sleepiness despite regular use of machine  Issues tolerating the machine like bloating sensation, air hunger, too much hot air, too much pressure, taking off mask without recall in the middle of sleep, etc.     Other conservative  measures to improve sleep apnea:  Losing weight can lead to some improvement of TOMA which means you will need lower pressures in machine to control your TOMA. In some patients, they don't need to use the machine after bariatric surgery. Hence, consider medical and/or surgical weight loss especially if your BMI is more than 35.  Avoiding alcohol, sedative-hypnotics, and sedating medications is imperative as these substances can worsen snoring and sleep apnea  If you have nasal congestion or seasonal allergies, improving your breathing through the nose is critical for treating sleep apnea, tolerating CPAP, and improving your sleep; hence, using intranasal steroid spray like Flonase might help. Make sure you know the proper way to use it.  Stay off your back when sleeping.    Common issues with PAP machine:  Mask gets dislodged when turning to the side: Consider getting a CPAP pillow or switching to a mask with hose on top.     Dry mouth:  Your machine has built-in humidifier that heats up the air to prevent dry mouth. It can be adjusted to your comfort. You can try that first and increase setting one level one night at a time to check which setting is comfortable and effective in lessening dry mouth. In some patients with heated hose, adjusting tube temperature to make air warmer can improve dry mouth. If dry mouth persists despite adjusting humidity or tube temperature setting, may apply OTC Biotene gel over the gums at bedtime.  If Biotene gel is not effective, consider trying XEROSTOM gel from [employer].  Also, eliminate or reduce dose of medications that can cause dry mouth if possible. Lastly, may try getting a separate room humidifier machine.    Airleaks: Please call DME as they may need to adjust your mask or refit you with a different kind or different size of mask. In addition, you can ask DME for tips on getting a good mask seal and mask fit.     Difficulty tolerating the mask: Contact your DME to try a  "different kind of mask and/or call office to get a referral to Sleep Psychologist for CPAP desensitization. CPAP desensitization technique is a set of strategies that helps patient cope with claustrophobia and anxiety related to wearing mask. Alternatively, we can do a daytime mini-sleep study called PAP-nap trial wherein you will try on different kinds of mask and the sleep technician will try different pressure settings on CPAP and BPAP machines to see which specific pressure is tolerable and comfortable for you.     Water droplets or moisture within the hose and/or mask: This is called rain-out and it is caused by condensation of too much heated humidity on the cooler walls of the hose. If you have rain-out, turn down humidity settings or get a heated hose. If you already have a heated hose, turn up the \"tube temperature\" of the heated hose. Alternatively, if you don't want to get a heated hose or warmer air, may wrap the CPAP hose with stockings to keep it somewhat warm. Also, you need to place the machine on the floor and lower the hose so that water won't travel upward towards your mask.     Maintaining your CPAP/BPAP device:    The humidification chamber (aka water tank or water chamber) needs to be filled with distilled water to prevent buildup of white deposits in the future. If you cannot find distilled water, you can use tap water but expect to have white deposits buildup seen after prolonged use with tap water. If you start seeing white deposits on the water chamber, you can clean it by filling it with equal parts of distilled white vinegar and water. Let the vinegar-water mixture sit for 2 hours, and then rinse it with running tap water. Clean with soap and water then let it dry.     You should try to keep your machine clean in order to work well. Here are some tips to clean PAP supplies / accessories:    Clean the humidification chamber (aka water tank) as well as your mask and tubing at least once a " week with soap and water.   Alternatively, you can fill a sink or basin with warm water and add a little mild detergent, like Ivory dish soap. Gently wipe your supplies with the soapy water to free all the oils and dirt that may have collected. Once that's done, rinse these items with clean water until the soap is gone and let them air dry. You can hang your tubing over the curtain cheryl in your bathroom so that it dries.  The mask insert (part of the mask that has contact with your skin) needs to be cleaned with soap and water daily. Another option is to wipe them down with CPAP wipes or baby wipes.    You should replace your mask and tubing frequently in order to prevent bacteria buildup, machine damage, and mask seal issues. The older the mask and hose, the high likelihood that there is bacteria buildup in it especially if they are not cleaned regularly. Dirty filters damage machines because build-up of dust and contaminants can cause machine to over-heat, and in time, damage the motor of machine. Cushions lose their seal over time as most masks are made of plastic and silicone while headgear is made of neoprene. These materials will break down with age and frequent use. Here is the recommended replacement schedule for PAP supplies / accessories:    Twice a month- disposable filters and cushions for nasal mask or nasal pillows.  Once a month- cushion for full face mask  Every 3 months- mask with headgear and PAP tubing (standard or heated hose)  Every 6 months- reusable filter, water chamber, and chin strap     Other useful information:    Some people do not put water in the tank while other people prefers to put water in the tank to prevent mouth dryness. Try to experiment to determine which is more comfortable for you.   In general, new machines have 2 years warranty on parts while health insurance allows you to have a new machine once every 5 years.     You can also go to the following EDUCATION WEBSITES for  further information:   American Academy of Sleep Medicine http://sleepeducation.org  National Sleep Foundation: https://sleepfoundation.org  American Sleep Apnea Association: https://www.sleepapnea.org (for patients with sleep apnea)  Narcolepsy Network: https://www.narcolepsynetwork.org (for patients with narcolepsy)  [facility]: https://www.Nepheracolepsy.org (for patients with narcolepsy)  Hypersomnia Foundation: https://www.hypersomniafoundation.org (for patients with idiopathic hypersomnia)  RLS foundation: https://www.rls.org (for patients with restless leg syndrome)    IMPORTANT INFORMATION     Call 911 for medical emergencies.  Our offices are generally open from Monday-Friday, 8 am - 5 pm.   There are no supporting services by either the sleep doctors or their staff on weekends and Holidays, or after 5 PM on weekdays.   If you need to get in touch with me, you may either call my office number or you can use MyGoodPoints.  If a referral for a test, for CPAP, or for another specialist was made, and you have not heard about scheduling this within a week, please call scheduling at [phone] ([phone]).  If you are unable to make your appointment for clinic or an overnight study, kindly call the office or sleep testing center at least 48 hours in advance to cancel and reschedule.  If you are on CPAP, please bring your device's card and/or the device to each clinic appointment.   In case of problems with PAP machine or mask interface, please contact your [employer] (Durable Medical Equipment) company first. [employer] is the company who provides you the machine and/or PAP supplies.       PRESCRIPTIONS     We require 7 days advanced notice for prescription refills. If we do not receive the request in this time, we cannot guarantee that your medication will be refilled in time.    IMPORTANT PHONE NUMBERS     Sleep Medicine Clinic Fax: [phone]  Appointments (for Pediatric Sleep Clinic): [phone] - option  1  Appointments (for Adult Sleep Clinic): [phone] - option 2  Appointments (For Sleep Studies): [phone] - option 3  Behavioral Sleep Medicine: [phone]  Sleep Surgery: [phone]  Nutrition Service: [phone]  Weight management clinics with endocrinology: [phone]  Bariatric Services: [phone] (includes weight loss medications and weight loss surgery)  [facility]: [phone] (offers holistic approaches to weight management)  ENT (Otolaryngology): [phone]  Headache Clinic (Neurology): [phone]  Neurology: [phone]  Psychiatry: [phone]  Pulmonary Function Testing (PFT) Center: [phone]  Pulmonary Medicine: [phone]  [employer] ([place]): [phone]      OUR SLEEP TESTING LOCATIONS     Our team will contact you to schedule your sleep study, however, you can contact us as follow:  Main Phone Line (scheduling only): [phone]), option 3  Adult and Pediatric Locations  [facility] (6 years and older): [facility] - 4th floor ([address] [place] [address]) After hours line: [phone]  [facility] (Main campus: All ages): [facility], 6th floor. After hours line: [phone]   [place] (5 years and older; younger considered on case-by-case basis): [address]; [address], [address]. Scheduling  After hours line: [phone]   [place] (6 years and older): [address]; [address]; [address]   [place] (6 years and older): [address], [address]  After hours line: [phone]   [place] (13 years and older) in [place]: [address], 2nd floor  After hours line: [phone]   [place] (13 year and older): [address]  After hours line: [phone]     Adult Only Locations:   [place] (18 years and older): [address], 2nd floor   [place] (18 years and older): [address]; 4th floor  After hours line: [phone]   [place] (18 years and older) at [place]: [address] [facility] [facility]  After hours line: [phone]     CONTACTING YOUR SLEEP MEDICINE PROVIDER AND SLEEP TEAM      For issues with your machine or mask interface, please call your DME provider first. DME stands for durable medical company. DME is the company who provides you the machine and/or PAP supplies / accessories.   To schedule, cancel, or reschedule SLEEP STUDY APPOINTMENTS, please call the Main Phone Line at [phone] - option 3.   To schedule, cancel, or reschedule CLINIC APPOINTMENTS, you can do it in \"[employer]\", call [phone] to speak with my  (Emperatriz Redd), or call the Main Phone Line at [phone] - option 2  For CLINICAL QUESTIONS or MEDICATION REFILLS, please call direct line for Adult Sleep Nurses at [phone].   Lastly, you can also send a message directly to your provider through \"My Chart\", which is the email service through your  Records Account: [email or web address]       Here at [facility], we wish you a restful sleep!    "

## 2024-04-19 ENCOUNTER — OFFICE VISIT (OUTPATIENT)
Dept: PRIMARY CARE | Facility: CLINIC | Age: 29
End: 2024-04-19
Payer: COMMERCIAL

## 2024-04-19 VITALS
TEMPERATURE: 97 F | OXYGEN SATURATION: 98 % | BODY MASS INDEX: 52.43 KG/M2 | WEIGHT: 293 LBS | HEART RATE: 76 BPM | SYSTOLIC BLOOD PRESSURE: 124 MMHG | DIASTOLIC BLOOD PRESSURE: 78 MMHG

## 2024-04-19 DIAGNOSIS — Z11.3 SCREENING EXAMINATION FOR STD (SEXUALLY TRANSMITTED DISEASE): ICD-10-CM

## 2024-04-19 DIAGNOSIS — K21.9 GASTROESOPHAGEAL REFLUX DISEASE, UNSPECIFIED WHETHER ESOPHAGITIS PRESENT: ICD-10-CM

## 2024-04-19 DIAGNOSIS — F33.9 RECURRENT MAJOR DEPRESSIVE DISORDER, REMISSION STATUS UNSPECIFIED (CMS-HCC): ICD-10-CM

## 2024-04-19 DIAGNOSIS — Z00.00 ROUTINE GENERAL MEDICAL EXAMINATION AT A HEALTH CARE FACILITY: Primary | ICD-10-CM

## 2024-04-19 DIAGNOSIS — I10 BENIGN ESSENTIAL HTN: ICD-10-CM

## 2024-04-19 DIAGNOSIS — F31.9 BIPOLAR 1 DISORDER (MULTI): ICD-10-CM

## 2024-04-19 DIAGNOSIS — E66.01 CLASS 3 OBESITY (MULTI): ICD-10-CM

## 2024-04-19 PROCEDURE — 3078F DIAST BP <80 MM HG: CPT | Performed by: FAMILY MEDICINE

## 2024-04-19 PROCEDURE — 99214 OFFICE O/P EST MOD 30 MIN: CPT | Performed by: FAMILY MEDICINE

## 2024-04-19 PROCEDURE — 99395 PREV VISIT EST AGE 18-39: CPT | Performed by: FAMILY MEDICINE

## 2024-04-19 PROCEDURE — 3074F SYST BP LT 130 MM HG: CPT | Performed by: FAMILY MEDICINE

## 2024-04-19 PROCEDURE — 1036F TOBACCO NON-USER: CPT | Performed by: FAMILY MEDICINE

## 2024-04-19 PROCEDURE — 3008F BODY MASS INDEX DOCD: CPT | Performed by: FAMILY MEDICINE

## 2024-04-19 RX ORDER — HYDROCHLOROTHIAZIDE 12.5 MG/1
12.5 TABLET ORAL DAILY
Qty: 90 TABLET | Refills: 1 | Status: SHIPPED | OUTPATIENT
Start: 2024-04-19 | End: 2024-10-16

## 2024-04-19 RX ORDER — HYDROXYZINE HYDROCHLORIDE 50 MG/1
50 TABLET, FILM COATED ORAL NIGHTLY PRN
COMMUNITY
Start: 2024-02-02

## 2024-04-19 RX ORDER — PANTOPRAZOLE SODIUM 20 MG/1
20 TABLET, DELAYED RELEASE ORAL DAILY
Qty: 90 TABLET | Refills: 1 | Status: SHIPPED | OUTPATIENT
Start: 2024-04-19 | End: 2024-10-16

## 2024-04-19 RX ORDER — BUPROPION HYDROCHLORIDE 300 MG/1
300 TABLET ORAL EVERY MORNING
Qty: 90 TABLET | Refills: 1 | Status: SHIPPED | OUTPATIENT
Start: 2024-04-19 | End: 2024-10-16

## 2024-04-19 NOTE — PATIENT INSTRUCTIONS
1. Recurrent major depressive disorder, remission status unspecified (CMS-[facility])  Mood has been stable, Wellbutrin refilled  - buPROPion XL (Wellbutrin XL) 300 mg 24 hr tablet; Take 1 tablet (300 mg) by mouth once daily in the morning.  Dispense: 90 tablet; Refill: 1  - Referral to Obstetrics / Gynecology; Future    2. Benign essential HTN  Blood pressure stable, continue hydrochlorothiazide 12.5 mg  - hydroCHLOROthiazide (Microzide) 12.5 mg tablet; Take 1 tablet (12.5 mg) by mouth once daily.  Dispense: 90 tablet; Refill: 1    3. Gastroesophageal reflux disease, unspecified whether esophagitis present  Advised to decrease consumption of pantoprazole, using it as needed.  We discussed long-term side effects.  Advised that this can improve with weight loss  - pantoprazole (ProtoNix) 20 mg EC tablet; Take 1 tablet (20 mg) by mouth early in the morning..  Dispense: 90 tablet; Refill: 1    4. Routine general medical examination at a health care facility  Advised to get routine labs  - Hemoglobin A1C; Future  - Lipid Panel; Future  - Comprehensive Metabolic Panel; Future    5. Class 3 obesity (Multi)  We discussed ways to improve weight such as increasing exercise 30 minutes a daily and monitoring diet.  Patient plans to see nutritionist from her job and will start going to the gym    6. Bipolar 1 disorder (Multi)      7. Screening examination for STD (sexually transmitted disease)    - Trichomonas vaginalis, Nucleic Acid Detection; Future  - C. Trachomatis / N. Gonorrhoeae, Amplified Detection; Future  - HIV 1/2 Antigen/Antibody Screen with Reflex to Confirmation; Future  - Syphilis Screen with Reflex; Future

## 2024-04-19 NOTE — PROGRESS NOTES
Assessment     ASSESSMENT/PLAN:      Patient Instructions   1. Recurrent major depressive disorder, remission status unspecified (CMS-[facility])  Mood has been stable, Wellbutrin refilled  - buPROPion XL (Wellbutrin XL) 300 mg 24 hr tablet; Take 1 tablet (300 mg) by mouth once daily in the morning.  Dispense: 90 tablet; Refill: 1  - Referral to Obstetrics / Gynecology; Future    2. Benign essential HTN  Blood pressure stable, continue hydrochlorothiazide 12.5 mg  - hydroCHLOROthiazide (Microzide) 12.5 mg tablet; Take 1 tablet (12.5 mg) by mouth once daily.  Dispense: 90 tablet; Refill: 1    3. Gastroesophageal reflux disease, unspecified whether esophagitis present  Advised to decrease consumption of pantoprazole, using it as needed.  We discussed long-term side effects.  Advised that this can improve with weight loss  - pantoprazole (ProtoNix) 20 mg EC tablet; Take 1 tablet (20 mg) by mouth early in the morning..  Dispense: 90 tablet; Refill: 1    4. Routine general medical examination at a health care facility  Advised to get routine labs  - Hemoglobin A1C; Future  - Lipid Panel; Future  - Comprehensive Metabolic Panel; Future    5. Class 3 obesity (Multi)  We discussed ways to improve weight such as increasing exercise 30 minutes a daily and monitoring diet.  Patient plans to see nutritionist from her job and will start going to the gym    6. Bipolar 1 disorder (Multi)      7. Screening examination for STD (sexually transmitted disease)    - Trichomonas vaginalis, Nucleic Acid Detection; Future  - C. Trachomatis / N. Gonorrhoeae, Amplified Detection; Future  - HIV 1/2 Antigen/Antibody Screen with Reflex to Confirmation; Future  - Syphilis Screen with Reflex; Future           Signed by: Trung Piedra,        FUTURE DIRECTION:   []    Subjective   SUBJECTIVE:     HPI : Patient is a 29 y.o. female who presents today for the following:     Works for health department      Thyroid nodules  Follows endo, continue  to monitor      Anxiety/Bipolar/Depression:    on tele doc, part of insurance, psychiatry Currently taking rexulti 1mg daily and wellbutrin 300mg,      Preventative   LMP 4/18, follows OBGYN, needs pap, has nexplanon      HTN  controlled with hctz 12.5mg    Class III obesity  Plans to start exercise and food regimen to help with weight loss        Date Weight Intervention   04/19/24   296 none               Review of Systems    Past Medical History:   Diagnosis Date    Personal history of other diseases of the circulatory system     History of hypertension        Past Surgical History:   Procedure Laterality Date    OTHER SURGICAL HISTORY  08/11/2021    Appendectomy        Current Outpatient Medications   Medication Instructions    benzoyl peroxide 5 % external wash Topical    buPROPion XL (WELLBUTRIN XL) 300 mg, oral, Every morning    etonogestrel-eluting contraceptive (Nexplanon) 68 mg implant implant subcutaneous    hydroCHLOROthiazide (MICROZIDE) 12.5 mg, oral, Daily    hydrOXYzine HCL (ATARAX) 50 mg, oral, Nightly PRN    multivitamin tablet 1 tablet, oral, Daily    pantoprazole (PROTONIX) 20 mg, oral, Daily    Rexulti 1 mg, oral, Daily    vitamin D3-folic acid 2,500 unit- 1 mg tablet oral, Daily        No Known Allergies     Social History     Socioeconomic History    Marital status: Single     Spouse name: Not on file    Number of children: Not on file    Years of education: Not on file    Highest education level: Not on file   Occupational History    Not on file   Tobacco Use    Smoking status: Never    Smokeless tobacco: Never   Substance and Sexual Activity    Alcohol use: Never     Comment: not often    Drug use: Never     Types: Marijuana    Sexual activity: Not on file   Other Topics Concern    Not on file   Social History Narrative    Not on file     Social Determinants of Health     Financial Resource Strain: Not on file   Food Insecurity: Not on file   Transportation Needs: Not on file    Physical Activity: Not on file   Stress: Not on file   Social Connections: Not on file   Intimate Partner Violence: Not on file   Housing Stability: Not on file        No family history on file.     Objective     OBJECTIVE:     Vitals:    04/19/24 1033   BP: 124/78   Pulse: 76   Temp: 36.1 °C (97 °F)   SpO2: 98%   Weight: 134 kg (296 lb)        Physical Exam  Constitutional:       Appearance: She is obese.   HENT:      Head: Normocephalic and atraumatic.      Nose: Nose normal.      Mouth/Throat:      Mouth: Mucous membranes are moist.   Eyes:      Pupils: Pupils are equal, round, and reactive to light.   Cardiovascular:      Rate and Rhythm: Normal rate and regular rhythm.      Pulses: Normal pulses.      Heart sounds: No murmur heard.  Pulmonary:      Effort: Pulmonary effort is normal.      Breath sounds: Normal breath sounds.   Abdominal:      Tenderness: There is no abdominal tenderness.   Musculoskeletal:         General: Normal range of motion.      Cervical back: Normal range of motion.   Skin:     General: Skin is warm and dry.   Neurological:      Mental Status: She is alert.   Psychiatric:         Mood and Affect: Mood normal.

## 2024-07-19 ENCOUNTER — APPOINTMENT (OUTPATIENT)
Dept: PRIMARY CARE | Facility: CLINIC | Age: 29
End: 2024-07-19
Payer: COMMERCIAL

## 2024-07-29 ENCOUNTER — APPOINTMENT (OUTPATIENT)
Dept: OBSTETRICS AND GYNECOLOGY | Facility: CLINIC | Age: 29
End: 2024-07-29
Payer: COMMERCIAL

## 2024-10-29 ENCOUNTER — APPOINTMENT (OUTPATIENT)
Dept: OBSTETRICS AND GYNECOLOGY | Facility: CLINIC | Age: 29
End: 2024-10-29
Payer: COMMERCIAL

## 2024-10-29 VITALS — BODY MASS INDEX: 52.99 KG/M2 | DIASTOLIC BLOOD PRESSURE: 60 MMHG | SYSTOLIC BLOOD PRESSURE: 100 MMHG | WEIGHT: 293 LBS

## 2024-10-29 DIAGNOSIS — Z01.419 ENCOUNTER FOR WELL WOMAN EXAM WITH ROUTINE GYNECOLOGICAL EXAM: Primary | ICD-10-CM

## 2024-10-29 DIAGNOSIS — Z11.51 SCREENING FOR HUMAN PAPILLOMAVIRUS (HPV): ICD-10-CM

## 2024-10-29 DIAGNOSIS — Z12.4 SCREENING FOR MALIGNANT NEOPLASM OF CERVIX: ICD-10-CM

## 2024-10-29 PROCEDURE — 99395 PREV VISIT EST AGE 18-39: CPT | Performed by: NURSE PRACTITIONER

## 2024-10-29 PROCEDURE — 1036F TOBACCO NON-USER: CPT | Performed by: NURSE PRACTITIONER

## 2024-11-13 LAB
CYTOLOGY CMNT CVX/VAG CYTO-IMP: NORMAL
LAB AP HPV GENOTYPE QUESTION: YES
LAB AP HPV HR: NORMAL
LAB AP PREVIOUS ABNORMAL HISTORY: NORMAL
LABORATORY COMMENT REPORT: NORMAL
LMP START DATE: NORMAL
PATH REPORT.TOTAL CANCER: NORMAL

## 2025-04-17 ENCOUNTER — APPOINTMENT (OUTPATIENT)
Dept: SLEEP MEDICINE | Facility: CLINIC | Age: 30
End: 2025-04-17
Payer: COMMERCIAL

## 2025-06-06 ENCOUNTER — APPOINTMENT (OUTPATIENT)
Dept: PRIMARY CARE | Facility: CLINIC | Age: 30
End: 2025-06-06
Payer: COMMERCIAL

## 2025-06-06 VITALS
WEIGHT: 293 LBS | BODY MASS INDEX: 51.91 KG/M2 | TEMPERATURE: 97.5 F | SYSTOLIC BLOOD PRESSURE: 139 MMHG | HEART RATE: 87 BPM | HEIGHT: 63 IN | DIASTOLIC BLOOD PRESSURE: 90 MMHG | OXYGEN SATURATION: 96 %

## 2025-06-06 DIAGNOSIS — F33.9 RECURRENT MAJOR DEPRESSIVE DISORDER, REMISSION STATUS UNSPECIFIED: ICD-10-CM

## 2025-06-06 DIAGNOSIS — E66.01 MORBID OBESITY (MULTI): ICD-10-CM

## 2025-06-06 DIAGNOSIS — E78.00 ELEVATED LDL CHOLESTEROL LEVEL: ICD-10-CM

## 2025-06-06 DIAGNOSIS — F31.9 BIPOLAR 1 DISORDER (MULTI): ICD-10-CM

## 2025-06-06 DIAGNOSIS — E04.2 MULTIPLE THYROID NODULES: ICD-10-CM

## 2025-06-06 DIAGNOSIS — Z00.00 WELL ADULT EXAM: Primary | ICD-10-CM

## 2025-06-06 DIAGNOSIS — I10 BENIGN ESSENTIAL HTN: ICD-10-CM

## 2025-06-06 DIAGNOSIS — G47.33 OSA ON CPAP: ICD-10-CM

## 2025-06-06 PROBLEM — F33.40 RECURRENT MAJOR DEPRESSIVE DISORDER, IN REMISSION: Status: RESOLVED | Noted: 2023-07-21 | Resolved: 2025-06-06

## 2025-06-06 PROCEDURE — 3008F BODY MASS INDEX DOCD: CPT | Performed by: NURSE PRACTITIONER

## 2025-06-06 PROCEDURE — 1036F TOBACCO NON-USER: CPT | Performed by: NURSE PRACTITIONER

## 2025-06-06 PROCEDURE — 3080F DIAST BP >= 90 MM HG: CPT | Performed by: NURSE PRACTITIONER

## 2025-06-06 PROCEDURE — 99213 OFFICE O/P EST LOW 20 MIN: CPT | Performed by: NURSE PRACTITIONER

## 2025-06-06 PROCEDURE — 99395 PREV VISIT EST AGE 18-39: CPT | Performed by: NURSE PRACTITIONER

## 2025-06-06 PROCEDURE — 3075F SYST BP GE 130 - 139MM HG: CPT | Performed by: NURSE PRACTITIONER

## 2025-06-06 RX ORDER — BUPROPION HYDROCHLORIDE 300 MG/1
300 TABLET ORAL EVERY MORNING
Qty: 90 TABLET | Refills: 3 | Status: SHIPPED | OUTPATIENT
Start: 2025-06-06

## 2025-06-06 RX ORDER — HYDROCHLOROTHIAZIDE 12.5 MG/1
12.5 TABLET ORAL DAILY
Qty: 90 TABLET | Refills: 3 | Status: SHIPPED | OUTPATIENT
Start: 2025-06-06

## 2025-06-06 ASSESSMENT — ENCOUNTER SYMPTOMS
NERVOUS/ANXIOUS: 0
ACTIVITY CHANGE: 0
DIZZINESS: 0
COUGH: 0
PALPITATIONS: 0
WEAKNESS: 0
CONFUSION: 0
VOMITING: 0
RESPIRATORY NEGATIVE: 1
FATIGUE: 0
CONSTITUTIONAL NEGATIVE: 1
APNEA: 0
ABDOMINAL PAIN: 0
NAUSEA: 0
FEVER: 0
SHORTNESS OF BREATH: 0
HEADACHES: 0
CHILLS: 0

## 2025-06-06 ASSESSMENT — PATIENT HEALTH QUESTIONNAIRE - PHQ9
SUM OF ALL RESPONSES TO PHQ9 QUESTIONS 1 AND 2: 0
2. FEELING DOWN, DEPRESSED OR HOPELESS: NOT AT ALL
1. LITTLE INTEREST OR PLEASURE IN DOING THINGS: NOT AT ALL

## 2025-06-06 NOTE — PATIENT INSTRUCTIONS
Have labs completed/fasting.  Nothing to eat or drink after midnight.  Please review your labs via [employer] once resulted  Return to office in 1 year for physical exam and refills

## 2025-06-06 NOTE — PROGRESS NOTES
"Subjective   Patient ID: Pardeep Estrada is a 30 y.o. female who presents for New Patient Visit, Annual Exam, Obesity, Sleep Apnea, GERD, Hypertension, and Depression.    Annual Exam    30-year-old female here to establish care.  Past medical history of depression (PHQ: 0) and bipolar 1 disorder (reports she has behavioral health specialist), multiple thyroid nodules, hypertension.  Annual examination today  Patient with Nexplanon in left arm.  Plans to have removed and changed in October of this year.  Managed by OB/GYN  Last thyroid ultrasound below.  Asymptomatic.  Will check TSH level     Normal size mildly heterogeneous thyroid with bilateral isoechoic  subcentimeter nodules, TIRADS category 3. No follow-up imaging is  indicated based on TIRADS criteria and size.    Morbid obesity: BMI 53.60.  Working on diet and increasing physical activity    TOMA: On CPAP with no reported issues today    GERD: Off PPI.  Controlled with diet                  Review of Systems   Constitutional: Negative.  Negative for activity change, chills, fatigue and fever.   Respiratory: Negative.  Negative for apnea, cough and shortness of breath.    Cardiovascular:  Negative for chest pain and palpitations.   Gastrointestinal:  Negative for abdominal pain, nausea and vomiting.   Neurological:  Negative for dizziness, weakness and headaches.   Psychiatric/Behavioral:  Negative for confusion. The patient is not nervous/anxious.        Objective   /90   Pulse 87   Temp 36.4 °C (97.5 °F) (Temporal)   Ht 1.6 m (5' 3\")   Wt 137 kg (302 lb 9.6 oz)   SpO2 96%   BMI 53.60 kg/m²     Physical Exam  Vitals reviewed.   Constitutional:       Appearance: Normal appearance.   HENT:      Head: Normocephalic.   Cardiovascular:      Rate and Rhythm: Normal rate and regular rhythm.      Pulses: Normal pulses.      Heart sounds: Normal heart sounds.   Pulmonary:      Effort: Pulmonary effort is normal. No respiratory distress.      Breath sounds: " "Normal breath sounds. No wheezing.   Abdominal:      General: Bowel sounds are normal.   Neurological:      General: No focal deficit present.      Mental Status: She is alert and oriented to person, place, and time.   Psychiatric:         Mood and Affect: Mood normal.         Behavior: Behavior normal.         Assessment/Plan   Problem List Items Addressed This Visit           ICD-10-CM    TOMA on CPAP G47.33    Well-controlled on CPAP         Bipolar 1 disorder (Multi) F31.9    Wellbutrin for depression  Reports she does see a psychiatrist.         Morbid obesity (Multi) E66.01    .A dietary \"pattern\" means generally being in the habit of eating certain types of foods while limiting others. Some people need to follow a particular dietary pattern because of their individual health needs; for example, if you have high blood pressure, your health care provider might recommend a diet low in sodium (salt).     If you are trying to improve your overall health and lower your risk of disease, establishing a healthy dietary pattern can help. This does not have to mean being extremely restrictive or eating only healthy choices. It is more of a lifestyle choice to incorporate certain dietary components while limiting others. You can find which approaches work for you, and that can help you maintain and build on a healthy eating pattern over time.     Some dietary patterns have been found to have specific health benefits. Others may have benefits for some people, for example, those with specific health conditions.     Mediterranean diet -- A Mediterranean diet is high in fruits, vegetables, beans, nuts, and seeds. It also includes olive oil as a source of healthy fat, and moderate amounts of fish, poultry, and dairy products, but little red meat. Studies have found that this type of diet lowers the risk of heart disease and stroke and helps control blood sugar in people with diabetes. It may also lower the risk of certain " "types of cancer.     DASH diet -- The Dietary Approaches to Stop Hypertension (DASH) diet requires a person to eat four to five servings of fruit, four to five servings of vegetables, and two to three servings of low-fat dairy per day. All foods must contain less than 25 percent total fat per serving. It can help lower blood pressure, especially when the person also reduces their salt intake. The DASH diet may also lower the risk of other health problems, including colorectal cancer, heart disease, and gout (in males).     Plant-based diets -- Plant-based diets focus on vegetables, fruits, grains, beans, and nuts and limit, or completely avoid, food from animals, such as meat and dairy. There are different types of plant-based diets, including:     ?Macrobiotic - This type of diet includes lots of vegetables, fruits, legumes, and seaweeds, as well as whole grains like brown rice. People who follow a macrobiotic diet might limit animal foods to white meat or fish once or twice a week.     ?Semi-vegetarian (sometimes called \"flexitarian\") - Some people choose to eat meat only occasionally, or avoid red meat but eat poultry and/or fish.     ?Lacto-ovovegetarian - This means a person eats milk and dairy products as well as eggs, but no meat.     ?Lactovegetarian - This means a person eats milk and dairy products, but not eggs or meat.     ?Vegan - This means avoiding all food products that come from animal sources, including eggs and milk products.     Plant-based diets may lower the risk of obesity, heart disease, high blood pressure, diabetes, and some cancers. While plant-based diets are generally healthy, as with other patterns, it's also important to limit things like processed foods, unhealthy fats, sugar, and salt. If you do not eat meat and/or other animal products, it is important to ensure you are getting enough nutrients overall. Vegans, in particular, have been found to be low in nutrients like calcium and " vitamin B12.      Low-fat diet -- A low-fat diet involves limiting intake of calories from fat. This pattern may have some benefits when it comes to maintaining a lower weight, but overall it is likely no more effective than other approaches in helping people lose weight. In general, few, if any, other health benefits have been linked to low-fat diets.     When following a low-fat diet, it is important to focus on whole grains, legumes, fruits, and vegetables, with limited refined grains and sugar. Fats should be from healthy sources, such as fatty fish, nuts, olive oil, and canola oil.      Low-cholesterol diet -- Cholesterol is typically found in foods with a lot of saturated fat, like red meat, butter, and cheese. A low-cholesterol diet focuses on limiting the amount of cholesterol in the diet. Since high-cholesterol foods also generally are high in unhealthy fats (such as saturated fat), limiting the cholesterol in your diet can also help reduce the amount of unhealthy fats you consume.          Well adult exam - Primary Z00.00    Health Maintenance Topics with due status: Overdue       Topic Date Due    Varicella Vaccines Never done    Hepatitis C Screening Never done    HPV Vaccines 06/12/2014    Hepatitis A Vaccines 11/15/2014    COVID-19 Vaccine 09/01/2024     Health Maintenance Topics with due status: Not Due       Topic Last Completion Date    DTaP/Tdap/Td Vaccines 01/22/2017    Lipid Panel 07/15/2023    Yearly Adult Physical 10/29/2024    Cervical Cancer Screening 10/29/2024    Zoster Vaccines Not Due     Health Maintenance Topics with due status: Completed       Topic Last Completion Date    HIB Vaccines 10/11/1999    Hepatitis B Vaccines 09/26/2000    IPV Vaccines 08/06/2001    MMR Vaccines 08/06/2001    Meningococcal Vaccine 07/23/2012    HIV Screening 05/21/2022    Influenza Vaccine 10/23/2024     Health Maintenance Topics with due status: Aged Out       Topic Date Due    Rotavirus Vaccines Aged Out     Pneumococcal Vaccine: Pediatrics and At-Risk Adult Patients Aged Out            Recurrent major depressive disorder F33.9    Well controlled on wellbutrin   Follow up 1 year   Call for worsening          Benign essential HTN I10    Well controlled   Continue current poc

## 2025-06-06 NOTE — ASSESSMENT & PLAN NOTE
Health Maintenance Topics with due status: Overdue       Topic Date Due    Varicella Vaccines Never done    Hepatitis C Screening Never done    HPV Vaccines 06/12/2014    Hepatitis A Vaccines 11/15/2014    COVID-19 Vaccine 09/01/2024     Health Maintenance Topics with due status: Not Due       Topic Last Completion Date    DTaP/Tdap/Td Vaccines 01/22/2017    Lipid Panel 07/15/2023    Yearly Adult Physical 10/29/2024    Cervical Cancer Screening 10/29/2024    Zoster Vaccines Not Due     Health Maintenance Topics with due status: Completed       Topic Last Completion Date    HIB Vaccines 10/11/1999    Hepatitis B Vaccines 09/26/2000    IPV Vaccines 08/06/2001    MMR Vaccines 08/06/2001    Meningococcal Vaccine 07/23/2012    HIV Screening 05/21/2022    Influenza Vaccine 10/23/2024     Health Maintenance Topics with due status: Aged Out       Topic Date Due    Rotavirus Vaccines Aged Out    Pneumococcal Vaccine: Pediatrics and At-Risk Adult Patients Aged Out

## 2025-06-09 ENCOUNTER — OFFICE VISIT (OUTPATIENT)
Dept: OBSTETRICS AND GYNECOLOGY | Facility: CLINIC | Age: 30
End: 2025-06-09
Payer: COMMERCIAL

## 2025-06-09 VITALS
DIASTOLIC BLOOD PRESSURE: 98 MMHG | WEIGHT: 293 LBS | HEIGHT: 64 IN | BODY MASS INDEX: 50.02 KG/M2 | SYSTOLIC BLOOD PRESSURE: 132 MMHG

## 2025-06-09 DIAGNOSIS — N90.89 MASS OF VULVA: ICD-10-CM

## 2025-06-09 DIAGNOSIS — N76.0 BV (BACTERIAL VAGINOSIS): Primary | ICD-10-CM

## 2025-06-09 DIAGNOSIS — Z11.3 SCREENING EXAMINATION FOR STI: ICD-10-CM

## 2025-06-09 DIAGNOSIS — B96.89 BV (BACTERIAL VAGINOSIS): Primary | ICD-10-CM

## 2025-06-09 PROCEDURE — 3075F SYST BP GE 130 - 139MM HG: CPT | Performed by: OBSTETRICS & GYNECOLOGY

## 2025-06-09 PROCEDURE — 99213 OFFICE O/P EST LOW 20 MIN: CPT | Performed by: OBSTETRICS & GYNECOLOGY

## 2025-06-09 PROCEDURE — 3080F DIAST BP >= 90 MM HG: CPT | Performed by: OBSTETRICS & GYNECOLOGY

## 2025-06-09 PROCEDURE — 3008F BODY MASS INDEX DOCD: CPT | Performed by: OBSTETRICS & GYNECOLOGY

## 2025-06-09 PROCEDURE — 1036F TOBACCO NON-USER: CPT | Performed by: OBSTETRICS & GYNECOLOGY

## 2025-06-09 RX ORDER — METRONIDAZOLE 500 MG/1
500 TABLET ORAL 2 TIMES DAILY
Qty: 14 TABLET | Refills: 0 | Status: SHIPPED | OUTPATIENT
Start: 2025-06-09 | End: 2025-06-10

## 2025-06-09 NOTE — PROGRESS NOTES
Subjective   Patient ID: Pardeep Estrada is a 30 y.o. female who presents for No chief complaint on file..  HPI 30 years old G1, P0 presents with chief complaint of a lump in the vulvar area for 5 days.  She states that she shaved in that area and might have caught herself.  She denies any tenderness, bleeding or discharge.  She says this has never happened before.    Review of Systems   All other systems reviewed and are negative.      Objective   Physical Exam  Constitutional:       Appearance: She is obese.   Pulmonary:      Effort: Pulmonary effort is normal.   Genitourinary:     Vagina: Normal.      Cervix: Normal.      Comments: Cystic structure right labia minora about 2 cm nontender no redness no drainage vagina appears normal cervix normal slight discharge with fishy odor STD screen from the cervix done  Neurological:      Mental Status: She is alert.         Assessment/Plan   Problem List Items Addressed This Visit    None  Visit Diagnoses         Codes      BV (bacterial vaginosis)    -  Primary N76.0, B96.89    Relevant Medications    metroNIDAZOLE (Flagyl) 500 mg tablet      Screening examination for STI     Z11.3      Mass of vulva     N90.89        This seems to be a benign cyst.  I have recommended to some heating compresses or sitz bath and a course of Flagyl and follow-up with me in 2 weeks.  STD screening done per patient agreement..         Grayson Sanchez MD 06/09/25 2:54 PM

## 2025-06-10 ENCOUNTER — TELEPHONE (OUTPATIENT)
Dept: OBSTETRICS AND GYNECOLOGY | Facility: CLINIC | Age: 30
End: 2025-06-10
Payer: COMMERCIAL

## 2025-06-10 DIAGNOSIS — N76.0 BV (BACTERIAL VAGINOSIS): Primary | ICD-10-CM

## 2025-06-10 DIAGNOSIS — B96.89 BV (BACTERIAL VAGINOSIS): Primary | ICD-10-CM

## 2025-06-10 LAB
C TRACH RRNA SPEC QL NAA+PROBE: NOT DETECTED
N GONORRHOEA RRNA SPEC QL NAA+PROBE: NOT DETECTED
QUEST GC CT AMPLIFIED (ALWAYS MESSAGE): NORMAL

## 2025-06-10 RX ORDER — METRONIDAZOLE 500 MG/1
500 TABLET ORAL 2 TIMES DAILY
Qty: 14 TABLET | Refills: 0 | Status: SHIPPED | OUTPATIENT
Start: 2025-06-10 | End: 2025-06-17

## 2025-06-10 NOTE — TELEPHONE ENCOUNTER
Patient was prescribed Metronidazole yesterday (6/9/25) and it went to [facility] - the prescription needs to go to [facility]

## 2025-06-14 LAB
ALBUMIN SERPL-MCNC: 4.2 G/DL (ref 3.6–5.1)
ALP SERPL-CCNC: 73 U/L (ref 31–125)
ALT SERPL-CCNC: 18 U/L (ref 6–29)
ANION GAP SERPL CALCULATED.4IONS-SCNC: 10 MMOL/L (CALC) (ref 7–17)
AST SERPL-CCNC: 17 U/L (ref 10–30)
BASOPHILS # BLD AUTO: 21 CELLS/UL (ref 0–200)
BASOPHILS NFR BLD AUTO: 0.3 %
BILIRUB SERPL-MCNC: 0.3 MG/DL (ref 0.2–1.2)
BUN SERPL-MCNC: 12 MG/DL (ref 7–25)
CALCIUM SERPL-MCNC: 9.3 MG/DL (ref 8.6–10.2)
CHLORIDE SERPL-SCNC: 104 MMOL/L (ref 98–110)
CHOLEST SERPL-MCNC: 249 MG/DL
CHOLEST/HDLC SERPL: 5.9 (CALC)
CO2 SERPL-SCNC: 25 MMOL/L (ref 20–32)
CREAT SERPL-MCNC: 1.01 MG/DL (ref 0.5–0.96)
EGFRCR SERPLBLD CKD-EPI 2021: 77 ML/MIN/1.73M2
EOSINOPHIL # BLD AUTO: 41 CELLS/UL (ref 15–500)
EOSINOPHIL NFR BLD AUTO: 0.6 %
ERYTHROCYTE [DISTWIDTH] IN BLOOD BY AUTOMATED COUNT: 17 % (ref 11–15)
GLUCOSE SERPL-MCNC: 93 MG/DL (ref 65–99)
HCT VFR BLD AUTO: 43.6 % (ref 35–45)
HDLC SERPL-MCNC: 42 MG/DL
HGB BLD-MCNC: 13.5 G/DL (ref 11.7–15.5)
LDLC SERPL CALC-MCNC: 184 MG/DL (CALC)
LYMPHOCYTES # BLD AUTO: 2967 CELLS/UL (ref 850–3900)
LYMPHOCYTES NFR BLD AUTO: 43 %
MCH RBC QN AUTO: 26.8 PG (ref 27–33)
MCHC RBC AUTO-ENTMCNC: 31 G/DL (ref 32–36)
MCV RBC AUTO: 86.5 FL (ref 80–100)
MONOCYTES # BLD AUTO: 331 CELLS/UL (ref 200–950)
MONOCYTES NFR BLD AUTO: 4.8 %
NEUTROPHILS # BLD AUTO: 3540 CELLS/UL (ref 1500–7800)
NEUTROPHILS NFR BLD AUTO: 51.3 %
NONHDLC SERPL-MCNC: 207 MG/DL (CALC)
PLATELET # BLD AUTO: 354 THOUSAND/UL (ref 140–400)
PMV BLD REES-ECKER: 8.7 FL (ref 7.5–12.5)
POTASSIUM SERPL-SCNC: 4.3 MMOL/L (ref 3.5–5.3)
PROT SERPL-MCNC: 7.2 G/DL (ref 6.1–8.1)
RBC # BLD AUTO: 5.04 MILLION/UL (ref 3.8–5.1)
SODIUM SERPL-SCNC: 139 MMOL/L (ref 135–146)
TRIGL SERPL-MCNC: 107 MG/DL
TSH SERPL-ACNC: 1.93 MIU/L
WBC # BLD AUTO: 6.9 THOUSAND/UL (ref 3.8–10.8)

## 2025-06-19 ENCOUNTER — APPOINTMENT (OUTPATIENT)
Dept: PRIMARY CARE | Facility: CLINIC | Age: 30
End: 2025-06-19
Payer: COMMERCIAL

## 2025-06-20 ENCOUNTER — APPOINTMENT (OUTPATIENT)
Dept: PRIMARY CARE | Facility: CLINIC | Age: 30
End: 2025-06-20
Payer: COMMERCIAL

## 2025-06-24 ENCOUNTER — APPOINTMENT (OUTPATIENT)
Dept: OBSTETRICS AND GYNECOLOGY | Facility: CLINIC | Age: 30
End: 2025-06-24
Payer: COMMERCIAL

## 2025-06-27 ENCOUNTER — APPOINTMENT (OUTPATIENT)
Dept: OBSTETRICS AND GYNECOLOGY | Facility: CLINIC | Age: 30
End: 2025-06-27
Payer: COMMERCIAL

## 2025-06-27 ENCOUNTER — APPOINTMENT (OUTPATIENT)
Dept: PRIMARY CARE | Facility: CLINIC | Age: 30
End: 2025-06-27
Payer: COMMERCIAL

## 2025-06-27 VITALS
TEMPERATURE: 96.8 F | DIASTOLIC BLOOD PRESSURE: 93 MMHG | WEIGHT: 293 LBS | HEART RATE: 98 BPM | OXYGEN SATURATION: 97 % | SYSTOLIC BLOOD PRESSURE: 139 MMHG | BODY MASS INDEX: 51.9 KG/M2

## 2025-06-27 VITALS
BODY MASS INDEX: 50.02 KG/M2 | WEIGHT: 293 LBS | DIASTOLIC BLOOD PRESSURE: 80 MMHG | SYSTOLIC BLOOD PRESSURE: 120 MMHG | HEIGHT: 64 IN

## 2025-06-27 DIAGNOSIS — N90.89 MASS OF VULVA: Primary | ICD-10-CM

## 2025-06-27 DIAGNOSIS — Z00.00 WELL ADULT EXAM: ICD-10-CM

## 2025-06-27 DIAGNOSIS — I10 BENIGN ESSENTIAL HTN: ICD-10-CM

## 2025-06-27 DIAGNOSIS — E66.01 MORBID OBESITY (MULTI): Primary | ICD-10-CM

## 2025-06-27 PROCEDURE — 3079F DIAST BP 80-89 MM HG: CPT | Performed by: NURSE PRACTITIONER

## 2025-06-27 PROCEDURE — 3080F DIAST BP >= 90 MM HG: CPT | Performed by: NURSE PRACTITIONER

## 2025-06-27 PROCEDURE — 3008F BODY MASS INDEX DOCD: CPT | Performed by: NURSE PRACTITIONER

## 2025-06-27 PROCEDURE — 99214 OFFICE O/P EST MOD 30 MIN: CPT | Performed by: NURSE PRACTITIONER

## 2025-06-27 PROCEDURE — 3075F SYST BP GE 130 - 139MM HG: CPT | Performed by: NURSE PRACTITIONER

## 2025-06-27 PROCEDURE — 1036F TOBACCO NON-USER: CPT | Performed by: NURSE PRACTITIONER

## 2025-06-27 PROCEDURE — 99212 OFFICE O/P EST SF 10 MIN: CPT | Performed by: NURSE PRACTITIONER

## 2025-06-27 PROCEDURE — 3074F SYST BP LT 130 MM HG: CPT | Performed by: NURSE PRACTITIONER

## 2025-06-27 ASSESSMENT — ENCOUNTER SYMPTOMS
CONSTITUTIONAL NEGATIVE: 1
APNEA: 0
CONFUSION: 0
CHILLS: 0
SHORTNESS OF BREATH: 0
RESPIRATORY NEGATIVE: 1
WEAKNESS: 0
FATIGUE: 0
PALPITATIONS: 0
DIZZINESS: 0
NAUSEA: 0
VOMITING: 0
ABDOMINAL PAIN: 0
COUGH: 0
NERVOUS/ANXIOUS: 0
HEADACHES: 0
ACTIVITY CHANGE: 0
FEVER: 0

## 2025-06-27 ASSESSMENT — PATIENT HEALTH QUESTIONNAIRE - PHQ9
2. FEELING DOWN, DEPRESSED OR HOPELESS: NOT AT ALL
1. LITTLE INTEREST OR PLEASURE IN DOING THINGS: NOT AT ALL
SUM OF ALL RESPONSES TO PHQ9 QUESTIONS 1 AND 2: 0

## 2025-06-27 NOTE — ASSESSMENT & PLAN NOTE
Health Maintenance Topics with due status: Overdue       Topic Date Due    Varicella Vaccines Never done    Hepatitis C Screening Never done    HPV Vaccines 06/12/2014    Hepatitis A Vaccines 11/15/2014    COVID-19 Vaccine 09/01/2024     Health Maintenance Topics with due status: Not Due       Topic Last Completion Date    DTaP/Tdap/Td Vaccines 01/22/2017    Cervical Cancer Screening 10/29/2024    Yearly Adult Physical 06/06/2025    Lipid Panel 06/13/2025    Zoster Vaccines Not Due     Health Maintenance Topics with due status: Completed       Topic Last Completion Date    HIB Vaccines 10/11/1999    Hepatitis B Vaccines 09/26/2000    IPV Vaccines 08/06/2001    MMR Vaccines 08/06/2001    Meningococcal Vaccine 07/23/2012    HIV Screening 05/21/2022    Influenza Vaccine 10/23/2024     Health Maintenance Topics with due status: Aged Out       Topic Date Due    Rotavirus Vaccines Aged Out    Pneumococcal Vaccine: Pediatrics and At-Risk Adult Patients Aged Out

## 2025-06-27 NOTE — ASSESSMENT & PLAN NOTE
Follow-up 6 weeks virtual for reeval  Continue hydrochlorothiazide 12.5 mg  Check BP daily and keep log  No added salt diet, increase weight loss efforts with diet and exercise.  Referral given for nutritionist as well as clinical pharmacy to start GLP-1

## 2025-06-27 NOTE — ASSESSMENT & PLAN NOTE
"Nutritionist referral  Referral to clinical pharmacy.  Interested in tirzepatide   Has TOMA      A dietary \"pattern\" means generally being in the habit of eating certain types of foods while limiting others. Some people need to follow a particular dietary pattern because of their individual health needs; for example, if you have high blood pressure, your health care provider might recommend a diet low in sodium (salt).     If you are trying to improve your overall health and lower your risk of disease, establishing a healthy dietary pattern can help. This does not have to mean being extremely restrictive or eating only healthy choices. It is more of a lifestyle choice to incorporate certain dietary components while limiting others. You can find which approaches work for you, and that can help you maintain and build on a healthy eating pattern over time.     Some dietary patterns have been found to have specific health benefits. Others may have benefits for some people, for example, those with specific health conditions.     Mediterranean diet -- A Mediterranean diet is high in fruits, vegetables, beans, nuts, and seeds. It also includes olive oil as a source of healthy fat, and moderate amounts of fish, poultry, and dairy products, but little red meat. Studies have found that this type of diet lowers the risk of heart disease and stroke and helps control blood sugar in people with diabetes. It may also lower the risk of certain types of cancer.     DASH diet -- The Dietary Approaches to Stop Hypertension (DASH) diet requires a person to eat four to five servings of fruit, four to five servings of vegetables, and two to three servings of low-fat dairy per day. All foods must contain less than 25 percent total fat per serving. It can help lower blood pressure, especially when the person also reduces their salt intake. The DASH diet may also lower the risk of other health problems, including colorectal cancer, heart " "disease, and gout (in males).     Plant-based diets -- Plant-based diets focus on vegetables, fruits, grains, beans, and nuts and limit, or completely avoid, food from animals, such as meat and dairy. There are different types of plant-based diets, including:     ?Macrobiotic - This type of diet includes lots of vegetables, fruits, legumes, and seaweeds, as well as whole grains like brown rice. People who follow a macrobiotic diet might limit animal foods to white meat or fish once or twice a week.     ?Semi-vegetarian (sometimes called \"flexitarian\") - Some people choose to eat meat only occasionally, or avoid red meat but eat poultry and/or fish.     ?Lacto-ovovegetarian - This means a person eats milk and dairy products as well as eggs, but no meat.     ?Lactovegetarian - This means a person eats milk and dairy products, but not eggs or meat.     ?Vegan - This means avoiding all food products that come from animal sources, including eggs and milk products.     Plant-based diets may lower the risk of obesity, heart disease, high blood pressure, diabetes, and some cancers. While plant-based diets are generally healthy, as with other patterns, it's also important to limit things like processed foods, unhealthy fats, sugar, and salt. If you do not eat meat and/or other animal products, it is important to ensure you are getting enough nutrients overall. Vegans, in particular, have been found to be low in nutrients like calcium and vitamin B12.      Low-fat diet -- A low-fat diet involves limiting intake of calories from fat. This pattern may have some benefits when it comes to maintaining a lower weight, but overall it is likely no more effective than other approaches in helping people lose weight. In general, few, if any, other health benefits have been linked to low-fat diets.     When following a low-fat diet, it is important to focus on whole grains, legumes, fruits, and vegetables, with limited refined grains and " sugar. Fats should be from healthy sources, such as fatty fish, nuts, olive oil, and canola oil.      Low-cholesterol diet -- Cholesterol is typically found in foods with a lot of saturated fat, like red meat, butter, and cheese. A low-cholesterol diet focuses on limiting the amount of cholesterol in the diet. Since high-cholesterol foods also generally are high in unhealthy fats (such as saturated fat), limiting the cholesterol in your diet can also help reduce the amount of unhealthy fats you consume.

## 2025-06-27 NOTE — PROGRESS NOTES
Subjective   Patient ID: Pardeep Estrada is a 30 y.o. female who presents for morbid obesity, Hypertension, Sleep Apnea, and Hyperlipidemia.    Lab review      Mixed hyperlipidemia: Discussed low saturated fat diet and low trans fat diet.  Patient interested in a nutritionist referral.  She reports she has also increased her physical activity recently.  6 pound weight loss in last 2 weeks.  She is currently 298 pounds and BMI is 51.90  LDL elevated at 184    Hypertension: Blood pressure elevated today.  Typically diastolic greater than 90.  On hydrochlorothiazide 12.5 mg daily  We discussed decreasing salt intake and increasing physical activity.    Morbid obesity: Patient interested in increasing weight loss efforts with diet and exercise.  She is also open to GLP-1 injection weekly    TOMA: Increased weight loss efforts.  Continue CPAP  Consider tirzepatide.  Clinical pharmacy referral      .Recent Results (from the past 6 weeks)  -C. trachomatis / N. gonorrhoeae, Amplified, Urogenital:   Collection Time: 06/09/25  3:41 PM       Result                      Value             Ref Range           CHLAMYDIA TRACHOMATIS *     NOT DETECTED      NOT DETECTED        NEISSERIA GONORRHOEAE *     NOT DETECTED      NOT DETECTED        (Always Message)                                             -CBC and Auto Differential:   Collection Time: 06/13/25 10:11 AM       Result                      Value             Ref Range           WHITE BLOOD CELL COUNT      6.9               3.8 - 10.8 T*       RED BLOOD CELL COUNT        5.04              3.80 - 5.10 *       HEMOGLOBIN                  13.5              11.7 - 15.5 *       HEMATOCRIT                  43.6              35.0 - 45.0 %       MCV                         86.5              80.0 - 100.0*       MCH                         26.8 (L)          27.0 - 33.0 *       MCHC                        31.0 (L)          32.0 - 36.0 *       RDW                         17.0 (H)           11.0 - 15.0 %       PLATELET COUNT              354               140 - 400 Th*       MPV                         8.7               7.5 - 12.5 fL       ABSOLUTE NEUTROPHILS        3,540             1,500 - 7,80*       ABSOLUTE LYMPHOCYTES        2,967             850 - 3,900 *       ABSOLUTE MONOCYTES          331               200 - 950 ce*       ABSOLUTE EOSINOPHILS        41                15 - 500 fahad*       ABSOLUTE BASOPHILS          21                0 - 200 cell*       NEUTROPHILS                 51.3              %                   LYMPHOCYTES                 43.0              %                   MONOCYTES                   4.8               %                   EOSINOPHILS                 0.6               %                   BASOPHILS                   0.3               %              -Comprehensive Metabolic Panel:   Collection Time: 06/13/25 10:11 AM       Result                      Value             Ref Range           GLUCOSE                     93                65 - 99 mg/dL       UREA NITROGEN (BUN)         12                7 - 25 mg/dL        CREATININE                  1.01 (H)          0.50 - 0.96 *       EGFR                        77                > OR = 60 mL*       SODIUM                      139               135 - 146 mm*       POTASSIUM                   4.3               3.5 - 5.3 mm*       CHLORIDE                    104               98 - 110 mmo*       CARBON DIOXIDE              25                20 - 32 mmol*       ELECTROLYTE BALANCE         10                7 - 17 mmol/*       CALCIUM                     9.3               8.6 - 10.2 m*       PROTEIN, TOTAL              7.2               6.1 - 8.1 g/*       ALBUMIN                     4.2               3.6 - 5.1 g/*       BILIRUBIN, TOTAL            0.3               0.2 - 1.2 mg*       ALKALINE PHOSPHATASE        73                31 - 125 U/L        AST                         17                10 - 30 U/L         ALT                          18                6 - 29 U/L     -Lipid Panel:   Collection Time: 06/13/25 10:11 AM       Result                      Value             Ref Range           CHOLESTEROL, TOTAL          249 (H)           <200 mg/dL          HDL CHOLESTEROL             42 (L)            > OR = 50 mg*       TRIGLYCERIDES               107               <150 mg/dL          LDL-CHOLESTEROL             184 (H)           mg/dL (calc)        CHOL/HDLC RATIO             5.9 (H)           <5.0 (calc)         NON HDL CHOLESTEROL         207 (H)           <130 mg/dL (*  -TSH with reflex to Free T4 if abnormal:   Collection Time: 06/13/25 10:11 AM       Result                      Value             Ref Range           TSH W/REFLEX TO FT4         1.93              mIU/L                  Review of Systems   Constitutional: Negative.  Negative for activity change, chills, fatigue and fever.   Respiratory: Negative.  Negative for apnea, cough and shortness of breath.    Cardiovascular:  Negative for chest pain and palpitations.   Gastrointestinal:  Negative for abdominal pain, nausea and vomiting.   Neurological:  Negative for dizziness, weakness and headaches.   Psychiatric/Behavioral:  Negative for confusion. The patient is not nervous/anxious.        Objective   BP (!) 139/93   Pulse 98   Temp 36 °C (96.8 °F) (Temporal)   Wt 135 kg (298 lb 9.6 oz)   LMP 05/01/2025   SpO2 97%   BMI 51.90 kg/m²     Physical Exam  Vitals reviewed.   Constitutional:       Appearance: She is obese.   HENT:      Head: Normocephalic.   Cardiovascular:      Rate and Rhythm: Normal rate and regular rhythm.      Pulses: Normal pulses.      Heart sounds: Normal heart sounds.   Pulmonary:      Effort: Pulmonary effort is normal. No respiratory distress.      Breath sounds: Normal breath sounds. No wheezing.   Abdominal:      General: Bowel sounds are normal.   Neurological:      General: No focal deficit present.      Mental Status: She is alert and  "oriented to person, place, and time.   Psychiatric:         Mood and Affect: Mood normal.         Behavior: Behavior normal.         Assessment/Plan   Problem List Items Addressed This Visit           ICD-10-CM    Morbid obesity (Multi) - Primary E66.01    Nutritionist referral  Referral to clinical pharmacy.  Interested in tirzepatide   Has TOMA      A dietary \"pattern\" means generally being in the habit of eating certain types of foods while limiting others. Some people need to follow a particular dietary pattern because of their individual health needs; for example, if you have high blood pressure, your health care provider might recommend a diet low in sodium (salt).     If you are trying to improve your overall health and lower your risk of disease, establishing a healthy dietary pattern can help. This does not have to mean being extremely restrictive or eating only healthy choices. It is more of a lifestyle choice to incorporate certain dietary components while limiting others. You can find which approaches work for you, and that can help you maintain and build on a healthy eating pattern over time.     Some dietary patterns have been found to have specific health benefits. Others may have benefits for some people, for example, those with specific health conditions.     Mediterranean diet -- A Mediterranean diet is high in fruits, vegetables, beans, nuts, and seeds. It also includes olive oil as a source of healthy fat, and moderate amounts of fish, poultry, and dairy products, but little red meat. Studies have found that this type of diet lowers the risk of heart disease and stroke and helps control blood sugar in people with diabetes. It may also lower the risk of certain types of cancer.     DASH diet -- The Dietary Approaches to Stop Hypertension (DASH) diet requires a person to eat four to five servings of fruit, four to five servings of vegetables, and two to three servings of low-fat dairy per day. All " "foods must contain less than 25 percent total fat per serving. It can help lower blood pressure, especially when the person also reduces their salt intake. The DASH diet may also lower the risk of other health problems, including colorectal cancer, heart disease, and gout (in males).     Plant-based diets -- Plant-based diets focus on vegetables, fruits, grains, beans, and nuts and limit, or completely avoid, food from animals, such as meat and dairy. There are different types of plant-based diets, including:     ?Macrobiotic - This type of diet includes lots of vegetables, fruits, legumes, and seaweeds, as well as whole grains like brown rice. People who follow a macrobiotic diet might limit animal foods to white meat or fish once or twice a week.     ?Semi-vegetarian (sometimes called \"flexitarian\") - Some people choose to eat meat only occasionally, or avoid red meat but eat poultry and/or fish.     ?Lacto-ovovegetarian - This means a person eats milk and dairy products as well as eggs, but no meat.     ?Lactovegetarian - This means a person eats milk and dairy products, but not eggs or meat.     ?Vegan - This means avoiding all food products that come from animal sources, including eggs and milk products.     Plant-based diets may lower the risk of obesity, heart disease, high blood pressure, diabetes, and some cancers. While plant-based diets are generally healthy, as with other patterns, it's also important to limit things like processed foods, unhealthy fats, sugar, and salt. If you do not eat meat and/or other animal products, it is important to ensure you are getting enough nutrients overall. Vegans, in particular, have been found to be low in nutrients like calcium and vitamin B12.      Low-fat diet -- A low-fat diet involves limiting intake of calories from fat. This pattern may have some benefits when it comes to maintaining a lower weight, but overall it is likely no more effective than other approaches " in helping people lose weight. In general, few, if any, other health benefits have been linked to low-fat diets.     When following a low-fat diet, it is important to focus on whole grains, legumes, fruits, and vegetables, with limited refined grains and sugar. Fats should be from healthy sources, such as fatty fish, nuts, olive oil, and canola oil.      Low-cholesterol diet -- Cholesterol is typically found in foods with a lot of saturated fat, like red meat, butter, and cheese. A low-cholesterol diet focuses on limiting the amount of cholesterol in the diet. Since high-cholesterol foods also generally are high in unhealthy fats (such as saturated fat), limiting the cholesterol in your diet can also help reduce the amount of unhealthy fats you consume.          Relevant Orders    Referral to Nutrition Services    Referral to Clinical Pharmacy    Well adult exam Z00.00    Health Maintenance Topics with due status: Overdue       Topic Date Due    Varicella Vaccines Never done    Hepatitis C Screening Never done    HPV Vaccines 06/12/2014    Hepatitis A Vaccines 11/15/2014    COVID-19 Vaccine 09/01/2024     Health Maintenance Topics with due status: Not Due       Topic Last Completion Date    DTaP/Tdap/Td Vaccines 01/22/2017    Cervical Cancer Screening 10/29/2024    Yearly Adult Physical 06/06/2025    Lipid Panel 06/13/2025    Zoster Vaccines Not Due     Health Maintenance Topics with due status: Completed       Topic Last Completion Date    HIB Vaccines 10/11/1999    Hepatitis B Vaccines 09/26/2000    IPV Vaccines 08/06/2001    MMR Vaccines 08/06/2001    Meningococcal Vaccine 07/23/2012    HIV Screening 05/21/2022    Influenza Vaccine 10/23/2024     Health Maintenance Topics with due status: Aged Out       Topic Date Due    Rotavirus Vaccines Aged Out    Pneumococcal Vaccine: Pediatrics and At-Risk Adult Patients Aged Out            Benign essential HTN I10    Follow-up 6 weeks virtual for reeval  Continue  hydrochlorothiazide 12.5 mg  Check BP daily and keep log  No added salt diet, increase weight loss efforts with diet and exercise.  Referral given for nutritionist as well as clinical pharmacy to start GLP-1

## 2025-06-27 NOTE — PROGRESS NOTES
Subjective   Patient ID: Pardeep Estrada is a 30 y.o. female who presents for vaginal bump (Reviewing  EMR/Last Annual Exam: 10/29/2024/Negative Pap 2024).  HPI  Here with c/o lump on vulva. Was on abx for the last three weeks. Was being treated for BV. Saw Dr Sanchez- vulvar lump is benign cyst. The patient has not noticed any changes in the size of the cyst. No drainage. No fevers or chills. Recent STD testing was negative. No dysuria. Vaginal discharge has resolved since she completed her antibiotics.      Review of Systems   All other systems reviewed and are negative.      Objective   Physical Exam  Constitutional:       Appearance: Normal appearance.   Pulmonary:      Effort: Pulmonary effort is normal.   Genitourinary:         Comments: ~2 cm lump, soft on palpation. Feels fluid filled. No redness, drainage or tenderness on palpation.   Skin:     General: Skin is warm and dry.   Neurological:      Mental Status: She is alert.   Psychiatric:         Mood and Affect: Mood normal.         Behavior: Behavior normal.         Assessment/Plan   Diagnoses and all orders for this visit:  Mass of vulva  Vulvar cyst persists. Pt will continue warm soaks, use of epsom salt. Will follow-up with Dr Sanchez on Monday. Reviewed return precautions- pain, fever, drainage- to seek emergent care.        YOUSUF Sorensen-CNP 06/27/25 2:32 PM

## 2025-06-30 ENCOUNTER — APPOINTMENT (OUTPATIENT)
Dept: OBSTETRICS AND GYNECOLOGY | Facility: CLINIC | Age: 30
End: 2025-06-30
Payer: COMMERCIAL

## 2025-06-30 VITALS
HEIGHT: 65 IN | DIASTOLIC BLOOD PRESSURE: 100 MMHG | SYSTOLIC BLOOD PRESSURE: 130 MMHG | WEIGHT: 293 LBS | BODY MASS INDEX: 48.82 KG/M2

## 2025-06-30 DIAGNOSIS — N90.7 LABIAL CYST: Primary | ICD-10-CM

## 2025-06-30 PROCEDURE — 1036F TOBACCO NON-USER: CPT | Performed by: OBSTETRICS & GYNECOLOGY

## 2025-06-30 PROCEDURE — 99213 OFFICE O/P EST LOW 20 MIN: CPT | Performed by: OBSTETRICS & GYNECOLOGY

## 2025-06-30 PROCEDURE — 3008F BODY MASS INDEX DOCD: CPT | Performed by: OBSTETRICS & GYNECOLOGY

## 2025-06-30 PROCEDURE — 3075F SYST BP GE 130 - 139MM HG: CPT | Performed by: OBSTETRICS & GYNECOLOGY

## 2025-06-30 PROCEDURE — 3080F DIAST BP >= 90 MM HG: CPT | Performed by: OBSTETRICS & GYNECOLOGY

## 2025-06-30 ASSESSMENT — PATIENT HEALTH QUESTIONNAIRE - PHQ9
1. LITTLE INTEREST OR PLEASURE IN DOING THINGS: NOT AT ALL
2. FEELING DOWN, DEPRESSED OR HOPELESS: NOT AT ALL
SUM OF ALL RESPONSES TO PHQ9 QUESTIONS 1 & 2: 0

## 2025-06-30 NOTE — PROGRESS NOTES
Subjective   Patient ID: Pardeep Estrada is a 30 y.o. female who presents for No chief complaint on file..  HPI 30 years old with right labial cyst presents for follow-up.  She denies any pain and is requesting if she can just watch this does not want it to be removed.  She denies any drainage or pain.  She has been doing some sitz bath and warm compresses.  All her STD cultures came back negative.    Review of Systems   All other systems reviewed and are negative.      Objective   Physical Exam  Constitutional:       Appearance: She is obese.   Pulmonary:      Effort: Pulmonary effort is normal.   Genitourinary:     Labia:         Right: Lesion present.       Comments: Right labial cyst measuring 1 to 2 cm no change from prior exam.  Nontender no evidence of erythema or drainage feels cystic.  Neurological:      Mental Status: She is alert.         Assessment/Plan   Problem List Items Addressed This Visit    None  Visit Diagnoses         Codes      Labial cyst    -  Primary N90.7        I have reassured her that this is benign and she may want treatment.  She may also elect to have it removed in the office if she desires.         Grayson Sanchez MD 06/30/25 2:31 PM

## 2025-07-14 NOTE — PROGRESS NOTES
Clinical Pharmacy Appointment    Patient ID: Pardeep Estrada is a 30 y.o. female who presents for Weight Loss and Apnea.    Pt is here for First appointment.     Referring Provider: Litzy Valdivia APRN-CNP*  PCP: NETTIE Zhong, DNP  Last visit with PCP: 6/27/2025   Next visit with PCP: 8/8/2025    Subjective     Interval History  N/A    HPI  Weight Loss  Starting Weight: 303 lb  Starting BMI: 50.73 kg/m2    Current Weight: --  Current BMI: --    Comorbid Conditions:  HTN: yes (130/100)  Cholesterol: No  LDL >100  TC > 200  TOMA: Yes  The ASCVD Risk score ([place] DOWNEY, et al., 2019) failed to calculate for the following reasons:    The 2019 ASCVD risk score is only valid for ages 40 to 79      Diet/Lifestyle:  Has appt scheduled with Nutrition (8/7)      Objective   Allergies[1]  Social History     Social History Narrative    Not on file      Medication Review  Current Outpatient Medications   Medication Instructions    buPROPion XL (WELLBUTRIN XL) 300 mg, oral, Every morning    etonogestrel-eluting contraceptive (Nexplanon) 68 mg implant implant Inject under the skin. Left arm  2020  Coming out in nov 2025    hydroCHLOROthiazide (MICROZIDE) 12.5 mg, oral, Daily    hydrOXYzine HCL (ATARAX) 50 mg, Nightly PRN    multivitamin tablet 1 tablet, Daily    Rexulti 1 mg, Daily      Vitals  BP Readings from Last 2 Encounters:   06/30/25 (!) 130/100   06/27/25 120/80     BMI Readings from Last 1 Encounters:   06/30/25 50.73 kg/m²      Labs  A1C  Lab Results   Component Value Date    HGBA1C 5.6 07/15/2023    HGBA1C 5.5 05/21/2022     BMP  Lab Results   Component Value Date    CALCIUM 9.3 06/13/2025     06/13/2025    K 4.3 06/13/2025    CO2 25 06/13/2025     06/13/2025    BUN 12 06/13/2025    CREATININE 1.01 (H) 06/13/2025    EGFR 77 06/13/2025     LFTs  Lab Results   Component Value Date    ALT 18 06/13/2025    AST 17 06/13/2025    ALKPHOS 73 06/13/2025    BILITOT 0.3 06/13/2025     FLP  Lab Results  "  Component Value Date    TRIG 107 06/13/2025    CHOL 249 (H) 06/13/2025    LDLF 154 (H) 07/15/2023    LDLCALC 184 (H) 06/13/2025    HDL 42 (L) 06/13/2025     Urine Microalbumin  No results found for: \"MICROALBCREA\"    Weight Management  Wt Readings from Last 3 Encounters:   06/30/25 137 kg (303 lb)   06/27/25 135 kg (297 lb)   06/27/25 135 kg (298 lb 9.6 oz)      There is no height or weight on file to calculate BMI.     Assessment/Plan   Problem List Items Addressed This Visit       TOMA on CPAP - Primary    Relevant Orders    Referral to Clinical Pharmacy    Morbid obesity (Multi)    Patient and I meet today to discuss GLP-1 options for weight loss. We discuss therapy at length. Does not appear to be covered by insurance. Patient requests that we submit a PA for coverage based on diagnosis of TOMA. Will plan to do so today and follow up next week.         Relevant Orders    Referral to Clinical Pharmacy     Clinical Pharmacist follow-up: 7/25 @ 3:20 PM , Telehealth visit      Continue all meds under the continuation of care with the referring provider and clinical pharmacy team.    Thank you,  Lis Chanye, PharmD  Clinical Pharmacy Specialist     Verbal consent to manage patient's drug therapy was obtained from the patient. They were informed they may decline to participate or withdraw from participation in pharmacy services at any time.         [1] No Known Allergies    "

## 2025-07-18 ENCOUNTER — APPOINTMENT (OUTPATIENT)
Dept: PHARMACY | Facility: HOSPITAL | Age: 30
End: 2025-07-18
Payer: COMMERCIAL

## 2025-07-18 DIAGNOSIS — E66.01 MORBID OBESITY (MULTI): ICD-10-CM

## 2025-07-18 DIAGNOSIS — G47.33 OSA ON CPAP: Primary | ICD-10-CM

## 2025-07-18 NOTE — ASSESSMENT & PLAN NOTE
Patient and I meet today to discuss GLP-1 options for weight loss. We discuss therapy at length. Does not appear to be covered by insurance. Patient requests that we submit a PA for coverage based on diagnosis of TOMA. Will plan to do so today and follow up next week.

## 2025-07-23 NOTE — PROGRESS NOTES
Clinical Pharmacy Appointment    Patient ID: Pardeep Estrada is a 30 y.o. female who presents for Weight Loss and Apnea.    Pt is here for Follow Up.     Referring Provider: iLtzy Valdivia APRN-CNP*  PCP: NETTIE Zhong, DNP  Last visit with PCP: 6/27/2025   Next visit with PCP: 8/8/2025    Subjective     Interval History  PA for Zepbound denied     HPI  Weight Loss  Starting Weight: 303 lb  Starting BMI: 50.73 kg/m2    Current Weight: --  Current BMI: --    Comorbid Conditions:  HTN: yes (130/100)  Cholesterol: No  LDL >100  TC > 200  TOMA: Yes  The ASCVD Risk score ([place] DOWNEY, et al., 2019) failed to calculate for the following reasons:    The 2019 ASCVD risk score is only valid for ages 40 to 79      Diet/Lifestyle:  Has appt scheduled with Nutrition (8/7)      Objective   Allergies[1]  Social History     Social History Narrative    Not on file      Medication Review  Current Outpatient Medications   Medication Instructions    buPROPion XL (WELLBUTRIN XL) 300 mg, oral, Every morning    etonogestrel-eluting contraceptive (Nexplanon) 68 mg implant implant Inject under the skin. Left arm  2020  Coming out in nov 2025    hydroCHLOROthiazide (MICROZIDE) 12.5 mg, oral, Daily    hydrOXYzine HCL (ATARAX) 50 mg, Nightly PRN    multivitamin tablet 1 tablet, Daily    Rexulti 1 mg, Daily      Vitals  BP Readings from Last 2 Encounters:   06/30/25 (!) 130/100   06/27/25 120/80     BMI Readings from Last 1 Encounters:   06/30/25 50.73 kg/m²      Labs  A1C  Lab Results   Component Value Date    HGBA1C 5.6 07/15/2023    HGBA1C 5.5 05/21/2022     BMP  Lab Results   Component Value Date    CALCIUM 9.3 06/13/2025     06/13/2025    K 4.3 06/13/2025    CO2 25 06/13/2025     06/13/2025    BUN 12 06/13/2025    CREATININE 1.01 (H) 06/13/2025    EGFR 77 06/13/2025     LFTs  Lab Results   Component Value Date    ALT 18 06/13/2025    AST 17 06/13/2025    ALKPHOS 73 06/13/2025    BILITOT 0.3 06/13/2025     [facility]  Lab  "Results   Component Value Date    TRIG 107 06/13/2025    CHOL 249 (H) 06/13/2025    LDLF 154 (H) 07/15/2023    LDLCALC 184 (H) 06/13/2025    HDL 42 (L) 06/13/2025     Urine Microalbumin  No results found for: \"MICROALBCREA\"    Weight Management  Wt Readings from Last 3 Encounters:   06/30/25 137 kg (303 lb)   06/27/25 135 kg (297 lb)   06/27/25 135 kg (298 lb 9.6 oz)      There is no height or weight on file to calculate BMI.     Assessment/Plan   Problem List Items Addressed This Visit       Morbid obesity (Multi) - Primary    Patient was referred for initiation of a GLP-1 agonist for weight loss.  Discussed options available including Wegovy, Saxenda, and Zepbound.  Patient's insurance does not cover weight loss injections.  At this time patient would not like to start any injectable due to cost of medication.  Answered all patient's questions and concerns.                Clinical Pharmacist follow-up: as requested , Telehealth visit      Continue all meds under the continuation of care with the referring provider and clinical pharmacy team.    Thank you,  Lis Chaney, PharmD  Clinical Pharmacy Specialist     Verbal consent to manage patient's drug therapy was obtained from the patient. They were informed they may decline to participate or withdraw from participation in pharmacy services at any time.         [1] No Known Allergies    "

## 2025-07-25 ENCOUNTER — APPOINTMENT (OUTPATIENT)
Dept: PHARMACY | Facility: HOSPITAL | Age: 30
End: 2025-07-25
Payer: COMMERCIAL

## 2025-07-25 ENCOUNTER — TELEPHONE (OUTPATIENT)
Dept: PRIMARY CARE | Facility: CLINIC | Age: 30
End: 2025-07-25

## 2025-07-25 DIAGNOSIS — E66.01 MORBID OBESITY (MULTI): Primary | ICD-10-CM

## 2025-07-25 NOTE — ASSESSMENT & PLAN NOTE
Patient was referred for initiation of a GLP-1 agonist for weight loss.  Discussed options available including Wegovy, Saxenda, and Zepbound.  Patient's insurance does not cover weight loss injections.  At this time patient would not like to start any injectable due to cost of medication.  Answered all patient's questions and concerns.

## 2025-07-31 ENCOUNTER — APPOINTMENT (OUTPATIENT)
Dept: SLEEP MEDICINE | Facility: CLINIC | Age: 30
End: 2025-07-31
Payer: COMMERCIAL

## 2025-08-07 ENCOUNTER — APPOINTMENT (OUTPATIENT)
Dept: NUTRITION | Facility: CLINIC | Age: 30
End: 2025-08-07
Payer: COMMERCIAL

## 2025-08-07 VITALS — BODY MASS INDEX: 50.42 KG/M2 | HEIGHT: 65 IN

## 2025-08-07 DIAGNOSIS — E66.01 MORBID OBESITY (MULTI): Primary | ICD-10-CM

## 2025-08-07 PROCEDURE — 97802 MEDICAL NUTRITION INDIV IN: CPT | Mod: 95

## 2025-08-07 NOTE — PATIENT INSTRUCTIONS
Follow the Healthy Plate Method at meals- see handout.  This will help to increase your vegetable intake and decrease portion sizes of carbohydrates.   - Check out [employer] or [employer] for Mediterranean meal plans and recipes ideas.    When eating starchy foods, try to eat whole grains like potato with the skin, whole grain breads and cereals, brown rices and pastas.  Limit portions per the Healthy Plate Method.    Include protein with all meals and snacks.  Lean protein are better for cholesterol levels such as chicken(no skin), fish (baked or broiled or grilled), low-fat dairy, eggs, and peanut butter or nuts.    - For high protein snack ideas check out: https://www.[employer].com/article/779794/10-high-protein-snacks-that-keep-you-feeling-full-longer/  - Protein drinks between meals such as Premier Protein, Muscle Milk or Fairlife can help curb appetite.  4.   Aim to lose 1# per week to reach your goal weight.      - Consider tracking your calorie intake on an kti such as [employer] or [employer] to track your calories.  Aim fro 1700 calories daily.     - see sample meal plans for ideas.   5.   Consume less than 2000 mg of sodium daily.  Read labels and avoid fast foods, canned foods, frozen meals, and processed meats.  Use seasoning with out sodium such as Mrs. MEIER.    6.   Aim to drink 64 oz of water daily  7.   Aim for 30 minutes of exercise on most days.

## 2025-08-07 NOTE — PROGRESS NOTES
"Nutrition Assessment     Reason for Visit:  Pardeep Estrada is a 30 y.o. female who presents for nutrition counseling seeking weight management and HTN.    Virtual or Telephone Consent    While technically available, the patient was unable or unwilling to consent to connect via audio/video telehealth technology; therefore, I performed this visit using a real-time audio only connection between Pardeep Estrada & Malgorzata Moeller, NAVNEET, LD.  Verbal consent was requested and obtained from Pardeep Estrada on this date, 08/07/25 for a telehealth visit and the patient's location was confirmed at the time of the visit.    Anthropometrics:  Anthropometrics  Height: 165.1 cm (5' 5\")  Adjusted Body Weight (kg): 89 kg  Wt Readings from Last 10 Encounters:   06/30/25 137 kg (303 lb)   06/27/25 135 kg (297 lb)   06/27/25 135 kg (298 lb 9.6 oz)   06/09/25 138 kg (304 lb)   06/06/25 137 kg (302 lb 9.6 oz)   10/29/24 136 kg (299 lb 2.6 oz)   04/19/24 134 kg (296 lb)   04/16/24 135 kg (297 lb 11.2 oz)   10/17/23 127 kg (280 lb)   10/09/23 131 kg (289 lb)     Meds: Zepbound    Food And Nutrient Intake:  Food and Nutrient History  Food and Nutrient History: Pt presents for nutrition counseling in the context of weight managment.  Pt's physician has prescribed Zepbound for pt but they are waiting for insurnace approval.  BMI indicates morbid obesity.  Pt states that she has ahd issues with binge eating in the past.  She also eats fast food on most days.  Pt has been trying to do more meal preparation on the weekends and tries to include vegetables.  Pt also getting an excessive amount of sugar and calories from Pepsi and Mt. Dew.  Advised her to attempt to wean herself off of regular soda pop which provides a lot of empty calories.  Pt may benefit from following a 1700 calorie diet to pormote a 1-2# weight loss per week.  Encourage pt to include protei with all meals and snacks.     Food Intake  Meal 1: breakfast: toast hashbrown, eggs, or " "Collectionss  Meal 2: lunch; Packs sometimes; Ziyad Titus  Snacks : snacks: peanut butter crackesr, candy bars  Meal 3: dinner: Meal prep; salmon, vegetables, frozen, chicken, beef, pasta as a main dish; mashed potatoes,  Snacks: ice cream  Fluid Intake: Heavy soda drinker; no coffee, water; 5-6 Pepsi; Mt. Dew  Pattern of Alcohol Consumption: occ           Bioactive Substance Intake  Pattern of Alcohol Consumption: occ         Micronutrient Intake  Vitamin Intake: Multivitamin, D    Food Supplement Intake  Vitamin Intake: Multivitamin, D    Physical Activities:  Physical Activity  Type of Physical Activity: minimal currently     Knowledge Beliefs Attitudes and Behavior       Belief and Attitude Determination  Beliefs and Attitudes: (!) Motivation       Binge Eating and Purging Behavior  Binge Eating Behavior: Present  Frequency: occasionally- not daily     Nutrition Focused Physical Exam:  Subcutaneous Fat Loss  Defer Subcutaneous Fat Loss Assessment: Defer all  Defer All Reason: virtual visit  Muscle Wasting  Defer Muscle Wasting Assessment: Defer all  Physical Findings  Digestive System Findings: Constipation        Energy Needs  Calculated Energy Needs Using Equations  Height: 165.1 cm (5' 5\")  Weight Used for Equation Calculations: 137 kg (302 lb 0.5 oz)  Glascock- [place] You Equation (Overweight or Obese Patients): 2096  Activity Factor: 1.3  Total Energy Needs: 2724  Estimated Energy Needs  Total Energy Estimated Needs in 24 hours (kCal): 1724 kCal  Method for Estimating Needs: MSJ x 1.3 -1000 kcals  Estimated Protein Needs  Total Protein Estimated Needs in 24 Hours (g): 90 g  Method for Estimating 24 Hour Protein Needs: 1g/kg adj BW        Nutrition Diagnosis   Malnutrition Diagnosis  Patient has Malnutrition Diagnosis: No  Nutrition Diagnosis  Patient has Nutrition Diagnosis: Yes  Diagnosis Status (1): New  Nutrition Diagnosis 1: Unbalanced diet pattern  Related to (1): limited or lack of prior " nutrition-related education  As Evidenced by (1): per pt report looking for guidance on healthy eating patterns, intake of unbalanced meals and snacks per diet recall  Additional Nutrition Diagnosis: Diagnosis 2  Diagnosis Status (2): New  Nutrition Diagnosis 2: Excessive mineral intake  Related to (2): excessive fast food consumption  As Evidenced by (2): HTN    Nutrition Interventions/Recommendations   Food and Nutrition Delivery  Meals & Snacks: Energy-modified diet, General Healthful Diet  Goals: Healthy Plate Model; 1700 kcals/day; <2000 mg of sodium  Bioactive Substance Management: Caffeine management  Goals: less intake of Pepsi and Mt. Dew  Nutrition Education  Nutrition Education Content: Content related nutrition education, Education on nutrition's influence on health, Physical activity guidance  Goals: Instructed on counting macronutrient intake, proper portion sizes, and general healthful nutrition. Instructed on benefits of wt loss with diabetes and heart health. Discussed mindful eating, slow gradual wt loss and goals beyond wt. Instructed pt to not skip meals - discussed this may lead to over eating later or snacking more than planned. Instructed on importance of physical activity for wt loss and maintenance of wt loss. Both verbal and written education provided in the one-on-one setting. Pt verbalized understanding of information provided. All questions answered to pt satisfaction throughout visit    Patient Instructions   Follow the Healthy Plate Method at meals- see handout.  This will help to increase your vegetable intake and decrease portion sizes of carbohydrates.   - Check out [email or web address] or [email or web address] for Mediterranean meal plans and recipes ideas.    When eating starchy foods, try to eat whole grains like potato with the skin, whole grain breads and cereals, brown rices and pastas.  Limit portions per the Healthy Plate Method.    Include protein with all meals and snacks.  Lean protein  are better for cholesterol levels such as chicken(no skin), fish (baked or broiled or grilled), low-fat dairy, eggs, and peanut butter or nuts.    - For high protein snack ideas check out: [email or web address]://[email or web address].com/article/839755/10-high-protein-snacks-that-keep-you-feeling-full-longer/  - Protein drinks between meals such as Premier Protein, Muscle Milk or Fairlife can help curb appetite.  4.   Aim to lose 1# per week to reach your goal weight.      - Consider tracking your calorie intake on an kit such as [employer] or [employer] to track your calories.  Aim fro 1700 calories daily.     - see sample meal plans for ideas.   5.   Consume less than 2000 mg of sodium daily.  Read labels and avoid fast foods, canned foods, frozen meals, and processed meats.  Use seasoning with out sodium such as Mrs. MEIER.    6.   Aim to drink 64 oz of water daily  7.   Aim for 30 minutes of exercise on most days.         Nutrition Monitoring and Evaluation   Food and Nutrient Intake  Monitoring and Evaluation Plan: Meal/snack pattern, Energy intake  Energy Intake: Estimated energy intake  Criteria: 1700 kcals/day  Meal/Snack Pattern: Estimated meal and snack pattern  Criteria: Monitor patient's progress towards personal nutrition goal(s)  Additional Plan: <2000 mg of sodium daily  Knowledge Belief Attitude Determination   Monitoring and Evaluation Plan: Physical activity  Physical activity: Physical activity history  Criteria: 150 minutes of exercise daily  Anthropometric measurements  Monitoring and Evaluation Plan: Weight  Body Weight: Measured body weight  Criteria: Monitor patient's progress towards intentional weight loss of 0.5-2 lb per week, trending toward a clinically significant weight loss of 5-10% of current body weight.       Readiness to Change : Good  Level of Understanding : Good  Anticipated Compliant : Good

## 2025-08-08 ENCOUNTER — PATIENT MESSAGE (OUTPATIENT)
Dept: SLEEP MEDICINE | Facility: CLINIC | Age: 30
End: 2025-08-08

## 2025-08-08 ENCOUNTER — APPOINTMENT (OUTPATIENT)
Dept: PRIMARY CARE | Facility: CLINIC | Age: 30
End: 2025-08-08
Payer: COMMERCIAL

## 2025-08-08 DIAGNOSIS — E66.01 MORBID OBESITY (MULTI): Primary | ICD-10-CM

## 2025-08-08 DIAGNOSIS — Z79.899 DRUG THERAPY: ICD-10-CM

## 2025-08-08 DIAGNOSIS — G47.30 SLEEP-DISORDERED BREATHING: Primary | ICD-10-CM

## 2025-08-08 DIAGNOSIS — R73.01 IFG (IMPAIRED FASTING GLUCOSE): ICD-10-CM

## 2025-08-08 DIAGNOSIS — I10 BENIGN ESSENTIAL HTN: ICD-10-CM

## 2025-08-08 PROCEDURE — 1036F TOBACCO NON-USER: CPT | Performed by: NURSE PRACTITIONER

## 2025-08-08 PROCEDURE — 99213 OFFICE O/P EST LOW 20 MIN: CPT | Performed by: NURSE PRACTITIONER

## 2025-08-08 RX ORDER — PHENTERMINE HYDROCHLORIDE 15 MG/1
15 CAPSULE ORAL
Qty: 90 CAPSULE | Refills: 1 | Status: SHIPPED | OUTPATIENT
Start: 2025-08-08

## 2025-08-08 RX ORDER — HYDROCHLOROTHIAZIDE 25 MG/1
25 TABLET ORAL DAILY
Qty: 90 TABLET | Refills: 1 | Status: SHIPPED | OUTPATIENT
Start: 2025-08-08

## 2025-08-08 ASSESSMENT — ENCOUNTER SYMPTOMS
FATIGUE: 0
SWEATS: 0
COUGH: 0
SHORTNESS OF BREATH: 0
PALPITATIONS: 0
ORTHOPNEA: 0
CHILLS: 0
NECK PAIN: 0
PND: 0
BLURRED VISION: 0
HEADACHES: 0
HYPERTENSION: 1

## 2025-08-08 NOTE — ASSESSMENT & PLAN NOTE
Increase hydrochlorothiazide to 25 mg daily  BMP in 1 month  Check BP daily and call for consistently elevated BP greater than 140/90  Increase physical activity, no added salt diet

## 2025-08-08 NOTE — TELEPHONE ENCOUNTER
Called pt and told her that I agree with plan to do PSG since there are arousal based hypopneas that can be missed in HSAT and therefore HSAT underestimates severity of TOMA. Also, this is true that some insurance wants pt to be on moderate or severe TOMA in order to cover Zepbound. Placed order for PSG with CO2 monitoring

## 2025-08-08 NOTE — PROGRESS NOTES
Subjective   Patient ID: Pardeep Estrada is a 30 y.o. female who presents for Hypertension and Obesity.    Hypertension: Completed BP during visit 132/90. Admits to non adherence in checking daily   Patient is taking hydrochlorothiazide 12.5mg daily   She is working on weight loss. No improvement with diet/ exercise   BMI 50.42      Obesity: Working on weight management with diet and exercise.  Insurance declined approval of Zepbound.  Patient has TOMA however insurance deemed that medication was being used for weight loss so they denied it.  Patient willing to trial other medications.  Will evaluate for type 2 diabetes mellitus    .Virtual or Telephone Consent    An interactive audio and video telecommunication system which permits real time communications between the patient (at the originating site) and provider (at the distant site) was utilized to provide this telehealth service.   Verbal consent was requested and obtained from Pardeep Estrada on this date, 08/08/25 for a telehealth visit and the patient's location was confirmed at the time of the visit.     Hypertension  This is a chronic problem. The current episode started more than 1 year ago. The problem has been gradually worsening since onset. The problem is controlled. Pertinent negatives include no anxiety, blurred vision, chest pain, headaches, malaise/fatigue, neck pain, orthopnea, palpitations, peripheral edema, PND, shortness of breath or sweats. There are no associated agents to hypertension. There are no known risk factors for coronary artery disease. Compliance problems include diet, exercise, medication cost, medication side effects and psychosocial issues.         Review of Systems   Constitutional:  Negative for chills, fatigue and malaise/fatigue.   Eyes:  Negative for blurred vision.   Respiratory:  Negative for cough and shortness of breath.    Cardiovascular:  Negative for chest pain, palpitations, orthopnea and PND.   Musculoskeletal:   Negative for neck pain.   Neurological:  Negative for headaches.       Objective   There were no vitals taken for this visit.    Physical Exam    Assessment/Plan   Problem List Items Addressed This Visit           ICD-10-CM    Morbid obesity (Multi) - Primary E66.01    Work on increasing diet and exercise  Start phentermine 15 mg daily  Risk/benefits/side effects discussed  Follow-up 1 month virtual         Relevant Medications    phentermine 15 mg capsule    Benign essential HTN I10    Increase hydrochlorothiazide to 25 mg daily  BMP in 1 month  Check BP daily and call for consistently elevated BP greater than 140/90  Increase physical activity, no added salt diet         Relevant Medications    hydroCHLOROthiazide (HYDRODiuril) 25 mg tablet    Other Relevant Orders    Basic metabolic panel    IFG (impaired fasting glucose) R73.01    Hemoglobin A1c ordered         Relevant Orders    Hemoglobin A1c     Other Visit Diagnoses         Codes      Drug therapy     Z79.899    Relevant Orders    POCT waived urine drug screen manually resulted

## 2025-08-08 NOTE — ASSESSMENT & PLAN NOTE
Work on increasing diet and exercise  Start phentermine 15 mg daily  Risk/benefits/side effects discussed  Follow-up 1 month virtual  Patient should call office for nurse visit appointment for UDS-contract signed for phentermine

## 2025-08-17 LAB
ANION GAP SERPL CALCULATED.4IONS-SCNC: 9 MMOL/L (CALC) (ref 7–17)
BUN SERPL-MCNC: 11 MG/DL (ref 7–25)
BUN/CREAT SERPL: 10 (CALC) (ref 6–22)
CALCIUM SERPL-MCNC: 9.6 MG/DL (ref 8.6–10.2)
CHLORIDE SERPL-SCNC: 100 MMOL/L (ref 98–110)
CO2 SERPL-SCNC: 28 MMOL/L (ref 20–32)
CREAT SERPL-MCNC: 1.07 MG/DL (ref 0.5–0.96)
EGFRCR SERPLBLD CKD-EPI 2021: 72 ML/MIN/1.73M2
EST. AVERAGE GLUCOSE BLD GHB EST-MCNC: 117 MG/DL
EST. AVERAGE GLUCOSE BLD GHB EST-SCNC: 6.5 MMOL/L
GLUCOSE SERPL-MCNC: 92 MG/DL (ref 65–99)
HBA1C MFR BLD: 5.7 %
POTASSIUM SERPL-SCNC: 3.7 MMOL/L (ref 3.5–5.3)
SODIUM SERPL-SCNC: 137 MMOL/L (ref 135–146)

## 2025-08-20 ENCOUNTER — APPOINTMENT (OUTPATIENT)
Dept: SLEEP MEDICINE | Facility: CLINIC | Age: 30
End: 2025-08-20
Payer: COMMERCIAL

## 2025-08-21 ENCOUNTER — OFFICE VISIT (OUTPATIENT)
Dept: SLEEP MEDICINE | Facility: CLINIC | Age: 30
End: 2025-08-21
Payer: COMMERCIAL

## 2025-08-21 VITALS
SYSTOLIC BLOOD PRESSURE: 117 MMHG | BODY MASS INDEX: 49.68 KG/M2 | OXYGEN SATURATION: 99 % | RESPIRATION RATE: 16 BRPM | DIASTOLIC BLOOD PRESSURE: 78 MMHG | WEIGHT: 291 LBS | HEART RATE: 94 BPM | TEMPERATURE: 97.4 F | HEIGHT: 64 IN

## 2025-08-21 DIAGNOSIS — I10 BENIGN ESSENTIAL HTN: ICD-10-CM

## 2025-08-21 DIAGNOSIS — K21.9 GERD WITHOUT ESOPHAGITIS: ICD-10-CM

## 2025-08-21 DIAGNOSIS — E66.01 MORBID OBESITY (MULTI): ICD-10-CM

## 2025-08-21 DIAGNOSIS — G47.33 OSA ON CPAP: Primary | ICD-10-CM

## 2025-08-21 PROCEDURE — 99213 OFFICE O/P EST LOW 20 MIN: CPT

## 2025-08-21 PROCEDURE — 1036F TOBACCO NON-USER: CPT

## 2025-08-21 PROCEDURE — 3008F BODY MASS INDEX DOCD: CPT

## 2025-08-21 PROCEDURE — 3078F DIAST BP <80 MM HG: CPT

## 2025-08-21 PROCEDURE — 3074F SYST BP LT 130 MM HG: CPT

## 2025-08-21 ASSESSMENT — SLEEP AND FATIGUE QUESTIONNAIRES
SITING INACTIVE IN A PUBLIC PLACE LIKE A CLASS ROOM OR A MOVIE THEATER: WOULD NEVER DOZE
HOW LIKELY ARE YOU TO NOD OFF OR FALL ASLEEP IN A CAR, WHILE STOPPED FOR A FEW MINUTES IN TRAFFIC: WOULD NEVER DOZE
HOW LIKELY ARE YOU TO NOD OFF OR FALL ASLEEP WHILE SITTING AND TALKING TO SOMEONE: WOULD NEVER DOZE
HOW LIKELY ARE YOU TO NOD OFF OR FALL ASLEEP WHILE LYING DOWN TO REST IN THE AFTERNOON WHEN CIRCUMSTANCES PERMIT: SLIGHT CHANCE OF DOZING
HOW LIKELY ARE YOU TO NOD OFF OR FALL ASLEEP WHEN YOU ARE A PASSENGER IN A CAR FOR AN HOUR WITHOUT A BREAK: SLIGHT CHANCE OF DOZING
HOW LIKELY ARE YOU TO NOD OFF OR FALL ASLEEP WHILE WATCHING TV: SLIGHT CHANCE OF DOZING
HOW LIKELY ARE YOU TO NOD OFF OR FALL ASLEEP WHILE SITTING QUIETLY AFTER LUNCH WITHOUT ALCOHOL: SLIGHT CHANCE OF DOZING
ESS-CHAD TOTAL SCORE: 5
HOW LIKELY ARE YOU TO NOD OFF OR FALL ASLEEP WHILE SITTING AND READING: SLIGHT CHANCE OF DOZING

## 2025-08-29 ENCOUNTER — APPOINTMENT (OUTPATIENT)
Dept: PRIMARY CARE | Facility: CLINIC | Age: 30
End: 2025-08-29
Payer: COMMERCIAL

## 2025-08-29 DIAGNOSIS — I10 BENIGN ESSENTIAL HTN: ICD-10-CM

## 2025-08-29 DIAGNOSIS — E66.01 MORBID OBESITY (MULTI): ICD-10-CM

## 2025-08-29 DIAGNOSIS — R73.03 PRE-DIABETES: Primary | ICD-10-CM

## 2025-08-29 PROCEDURE — 99213 OFFICE O/P EST LOW 20 MIN: CPT | Performed by: NURSE PRACTITIONER

## 2025-08-29 RX ORDER — PHENTERMINE HYDROCHLORIDE 37.5 MG/1
37.5 TABLET ORAL
Qty: 30 TABLET | Refills: 0 | Status: SHIPPED | OUTPATIENT
Start: 2025-08-29 | End: 2025-09-28

## 2025-08-29 ASSESSMENT — ENCOUNTER SYMPTOMS
COUGH: 0
SHORTNESS OF BREATH: 0
FATIGUE: 0
ACTIVITY CHANGE: 0
NERVOUS/ANXIOUS: 0
HEADACHES: 0
CHILLS: 0
VOMITING: 0
CONFUSION: 0
DIZZINESS: 0
RESPIRATORY NEGATIVE: 1
APNEA: 0
PALPITATIONS: 0
WEAKNESS: 0
CONSTITUTIONAL NEGATIVE: 1
FEVER: 0
ABDOMINAL PAIN: 0
NAUSEA: 0

## 2025-09-19 ENCOUNTER — APPOINTMENT (OUTPATIENT)
Dept: OBSTETRICS AND GYNECOLOGY | Facility: CLINIC | Age: 30
End: 2025-09-19
Payer: COMMERCIAL

## 2025-10-24 ENCOUNTER — APPOINTMENT (OUTPATIENT)
Dept: PRIMARY CARE | Facility: CLINIC | Age: 30
End: 2025-10-24
Payer: COMMERCIAL

## 2025-11-07 ENCOUNTER — APPOINTMENT (OUTPATIENT)
Dept: OBSTETRICS AND GYNECOLOGY | Facility: CLINIC | Age: 30
End: 2025-11-07
Payer: COMMERCIAL

## 2026-06-05 ENCOUNTER — APPOINTMENT (OUTPATIENT)
Dept: PRIMARY CARE | Facility: CLINIC | Age: 31
End: 2026-06-05
Payer: COMMERCIAL